# Patient Record
Sex: FEMALE | Race: WHITE | Employment: UNEMPLOYED | URBAN - NONMETROPOLITAN AREA
[De-identification: names, ages, dates, MRNs, and addresses within clinical notes are randomized per-mention and may not be internally consistent; named-entity substitution may affect disease eponyms.]

---

## 2018-07-26 ENCOUNTER — APPOINTMENT (OUTPATIENT)
Dept: GENERAL RADIOLOGY | Age: 63
End: 2018-07-26

## 2018-07-26 ENCOUNTER — HOSPITAL ENCOUNTER (EMERGENCY)
Age: 63
Discharge: HOME | End: 2018-07-26

## 2018-07-26 VITALS
RESPIRATION RATE: 18 BRPM | OXYGEN SATURATION: 98 % | HEART RATE: 90 BPM | SYSTOLIC BLOOD PRESSURE: 122 MMHG | DIASTOLIC BLOOD PRESSURE: 71 MMHG | BODY MASS INDEX: 21.26 KG/M2 | WEIGHT: 120 LBS | TEMPERATURE: 98.6 F | HEIGHT: 63 IN

## 2018-07-26 DIAGNOSIS — S70.01XA CONTUSION OF RIGHT HIP, INITIAL ENCOUNTER: Primary | ICD-10-CM

## 2018-07-26 PROCEDURE — 73502 X-RAY EXAM HIP UNI 2-3 VIEWS: CPT

## 2018-07-26 PROCEDURE — 99283 EMERGENCY DEPT VISIT LOW MDM: CPT

## 2018-07-26 RX ORDER — LEVOTHYROXINE SODIUM 0.07 MG/1
75 TABLET ORAL DAILY
COMMUNITY

## 2018-07-26 RX ORDER — METOPROLOL TARTRATE 50 MG/1
50 TABLET, FILM COATED ORAL 2 TIMES DAILY
COMMUNITY

## 2018-07-26 ASSESSMENT — ENCOUNTER SYMPTOMS
BACK PAIN: 0
SHORTNESS OF BREATH: 0
VOMITING: 0
ABDOMINAL PAIN: 0
NAUSEA: 0
EYE DISCHARGE: 0
SORE THROAT: 0
COUGH: 0
DIARRHEA: 0
RHINORRHEA: 0
WHEEZING: 0
EYE PAIN: 0

## 2018-07-26 ASSESSMENT — PAIN DESCRIPTION - ORIENTATION: ORIENTATION: RIGHT

## 2018-07-26 ASSESSMENT — PAIN DESCRIPTION - PAIN TYPE: TYPE: ACUTE PAIN;CHRONIC PAIN

## 2018-07-26 ASSESSMENT — PAIN SCALES - GENERAL: PAINLEVEL_OUTOF10: 10

## 2018-07-26 ASSESSMENT — PAIN DESCRIPTION - DESCRIPTORS: DESCRIPTORS: ACHING

## 2018-07-26 ASSESSMENT — PAIN DESCRIPTION - LOCATION: LOCATION: BACK;HIP

## 2018-07-26 NOTE — ED PROVIDER NOTES
past medical history on file. SURGICAL HISTORY      has no past surgical history on file. CURRENT MEDICATIONS       Previous Medications    LEVOTHYROXINE (SYNTHROID) 75 MCG TABLET    Take 75 mcg by mouth Daily    METOPROLOL TARTRATE (LOPRESSOR) 50 MG TABLET    Take 50 mg by mouth 2 times daily       ALLERGIES     has No Known Allergies. FAMILY HISTORY     has no family status information on file. family history is not on file. SOCIAL HISTORY      reports that she has been smoking. She has never used smokeless tobacco.    PHYSICAL EXAM     INITIAL VITALS:  height is 5' 3\" (1.6 m) and weight is 120 lb (54.4 kg). Her oral temperature is 98.6 °F (37 °C). Her blood pressure is 122/71 and her pulse is 90. Her respiration is 18 and oxygen saturation is 98%. Physical Exam   Constitutional: She is oriented to person, place, and time. She appears well-developed and well-nourished. HENT:   Head: Normocephalic and atraumatic. Right Ear: External ear normal.   Left Ear: External ear normal.   Eyes: Conjunctivae are normal. Right eye exhibits no discharge. Left eye exhibits no discharge. No scleral icterus. Neck: Normal range of motion. Neck supple. No JVD present. Pulmonary/Chest: Effort normal. No stridor. No respiratory distress. Abdominal: Soft. She exhibits no distension. Musculoskeletal: She exhibits no edema. Right hip: She exhibits decreased range of motion (secondary to pain) and tenderness (lateral aspect). She exhibits normal strength, no bony tenderness and no swelling. Neurological: She is alert and oriented to person, place, and time. She exhibits normal muscle tone. GCS eye subscore is 4. GCS verbal subscore is 5. GCS motor subscore is 6. Skin: Skin is warm and dry. She is not diaphoretic. No erythema. Psychiatric: She has a normal mood and affect. Her behavior is normal.   Nursing note and vitals reviewed.       DIFFERENTIAL DIAGNOSIS:   Fracture, sprain, strain, dislocation    DIAGNOSTIC RESULTS     EKG: All EKG's are interpreted by the Emergency Department Physician who either signs or Co-signs this chart in the absence of a cardiologist.  EKG interpreted by No att. providers found:    None    RADIOLOGY: non-plain film images(s) such as CT, Ultrasound and MRI are read by the radiologist.    XR HIP 2-3 VW W PELVIS RIGHT   Final Result   1. There is no fracture. **This report has been created using voice recognition software. It may contain minor errors which are inherent in voice recognition technology. **      Final report electronically signed by Dr. Catherine Norman on 7/26/2018 3:16 PM          LABS:   Labs Reviewed - No data to display    EMERGENCY DEPARTMENT COURSE:   Vitals:    Vitals:    07/26/18 1438   BP: 122/71   Pulse: 90   Resp: 18   Temp: 98.6 °F (37 °C)   TempSrc: Oral   SpO2: 98%   Weight: 120 lb (54.4 kg)   Height: 5' 3\" (1.6 m)       2:49 PM: The patient was seen and evaluated. Imaging was ordered and completed. MDM:  The patient was seen and evaluated within the ED today for the evaluation of right hip pain. The patient presented in no acute distress. Physical exam revealed minimal decreased range of motion secondary to pain and right lateral hip tenderness secondary to palpation. Right pelvis XR reveals no fracture. I observed the patient's condition to remain stable during the duration of their stay. She was amenable to my decision for discharge and was sent home in stable condition with Voltaren (50 mg tablet, instructed to take BID, as needed) for symptomatic relief. Upon discussing the imaging results with the patient she asked for a refill for her 5 mg Norco prescription. During review of her records I found that the patient was given 120 norco (7.5 mg) on 7-12-18 and 90 soma tablets on 7-10-18 and did not provided her with any narcotic medication and muscle relaxers. I discussed return precautions and she verbalized understanding.  I recommended that she f/u with her pcp if her symptoms worsen. All questions and concerns addressed. CRITICAL CARE:   None     CONSULTS:  None    PROCEDURES:  None     FINAL IMPRESSION      1. Contusion of right hip, initial encounter          DISPOSITION/PLAN   Discharged    PATIENT REFERRED TO:  Mark Oliverajessy Greer Treasure Castillo  [phone]    In 1 week        DISCHARGE MEDICATIONS:  New Prescriptions    DICLOFENAC (VOLTAREN) 50 MG EC TABLET    Take 1 tablet by mouth 2 times daily as needed for Pain       (Please note that portions of this note were completed with a voice recognition program.  Efforts were made to edit the dictations but occasionally words are mis-transcribed.)    Scribe:  Yin Slater 7/26/18 2:49 PM Scribing for and in the presence of Toney Mcneill PA-C    Signed by: Benitez Buenrostro, 07/26/18 3:31 PM    Provider:  I personally performed the services described in the documentation, reviewed and edited the documentation which was dictated to the scribe in my presence, and it accurately records my words and actions.     Toney Mcneill PA-C 7/26/18 3:31 PM        ALEKSANDRA Horvath  07/26/18 4259

## 2018-09-12 ENCOUNTER — OFFICE VISIT (OUTPATIENT)
Dept: PHYSICAL MEDICINE AND REHAB | Age: 63
End: 2018-09-12

## 2018-09-12 VITALS
SYSTOLIC BLOOD PRESSURE: 136 MMHG | HEART RATE: 69 BPM | BODY MASS INDEX: 21.62 KG/M2 | DIASTOLIC BLOOD PRESSURE: 84 MMHG | WEIGHT: 122 LBS | HEIGHT: 63 IN

## 2018-09-12 DIAGNOSIS — G89.4 CHRONIC PAIN SYNDROME: ICD-10-CM

## 2018-09-12 DIAGNOSIS — M47.814 SPONDYLOSIS OF THORACIC REGION WITHOUT MYELOPATHY OR RADICULOPATHY: ICD-10-CM

## 2018-09-12 DIAGNOSIS — M25.552 LEFT HIP PAIN: ICD-10-CM

## 2018-09-12 DIAGNOSIS — Z72.0 TOBACCO ABUSE: ICD-10-CM

## 2018-09-12 DIAGNOSIS — Z87.81 S/P LEFT HIP FRACTURE: ICD-10-CM

## 2018-09-12 DIAGNOSIS — M47.812 SPONDYLOSIS OF CERVICAL REGION WITHOUT MYELOPATHY OR RADICULOPATHY: Primary | ICD-10-CM

## 2018-09-12 PROCEDURE — 99244 OFF/OP CNSLTJ NEW/EST MOD 40: CPT | Performed by: PAIN MEDICINE

## 2018-09-12 RX ORDER — LORAZEPAM 0.5 MG/1
0.5 TABLET ORAL EVERY 8 HOURS PRN
COMMUNITY
End: 2022-04-14

## 2018-09-12 RX ORDER — CARISOPRODOL 350 MG/1
350 TABLET ORAL 3 TIMES DAILY PRN
COMMUNITY
End: 2022-04-14

## 2018-09-12 RX ORDER — HYDROCODONE BITARTRATE AND ACETAMINOPHEN 7.5; 325 MG/1; MG/1
1 TABLET ORAL EVERY 6 HOURS PRN
COMMUNITY
End: 2022-04-14

## 2018-09-12 ASSESSMENT — ENCOUNTER SYMPTOMS
VOMITING: 0
SINUS PRESSURE: 0
RHINORRHEA: 0
CHEST TIGHTNESS: 0
PHOTOPHOBIA: 0
VISUAL CHANGE: 1
ABDOMINAL PAIN: 0
SHORTNESS OF BREATH: 1
CONSTIPATION: 0
WHEEZING: 0
EYE PAIN: 0
BACK PAIN: 1
COUGH: 0
NAUSEA: 0
COLOR CHANGE: 0
SORE THROAT: 0
DIARRHEA: 0

## 2018-09-12 NOTE — LETTER
[address],[facility] [address]  [address] [address] [facility]  Phone: [phone]  Fax: [phone]    Brianne Vogel MD        September 12, 2018     [facility] [address] [place] [address]    Patient: Isai Baldwin  MR Number: [id number]  YOB: 1955  Date of Visit: 9/12/2018    Dear Dr. Joesph Gamez:    Thank you for the request for consultation for Isai Baldwin to me for the evaluation of neck pain. Below are the relevant portions of my assessment and plan of care. If you have questions, please do not hesitate to call me. I look forward to following Emmanuel Craft along with you.     Sincerely,        Brianne Vogel MD

## 2018-09-12 NOTE — PROGRESS NOTES
[address] [facility]  [address] [facility] [address].  Dept: [phone]  Dept Fax: [phone]: [phone]    Visit Date: 9/12/2018    Collin Lambert is a 63 y.o. female who is referred for pain management evaluation and treatment per Dr. Miguel Logan and [facility] Questions   1. In the last three months, have you felt you should cut down or stop drinking or using drugs? Yes []        No [x]     2. In the last three months, has anyone annoyed you or gotten on your nerves by telling you to cut down or stop drinking or using drugs? Yes []        No [x]     3. In the last three months, have you felt guilty or bad about how much you drink or use drugs? Yes []        No [x]     4. In the last three months, have you been waking up wanting to have an alcoholic drink or use drugs? Yes []        No [x]        Opioid Risk Tool:  Clinician Form       1. Family History of Substance Abuse: Female Male    Alcohol   []1   []3    Illegal drugs   []2   []3    Prescription drugs     []4   []4   2. Personal History of Substance Abuse:          Alcohol   []3   []3    Illegal drugs   []4   []4    Prescription drugs     []5   []5   3. Age (tani box if between 12 and 39):     []1   []1   4. History of Preadolescent Sexual Abuse:     []3   []0   5. Psychological Disease:      Attention deficit disorder, obsessive-compulsive disorder, bipolar, schizophrenia   []2   []2      Depression     []1   []1    Scoring Totals 0 0     Total Score  Low Risk  Moderate Risk  High Risk   Risk Category   0 - 3   4 - 7   8 or Above      Patient states symptoms interfere with:  A.  General Activity:  yes   B. Mood: yes    C. Walking Ability:   yes   D. Normal Work (Includes both work outside the home and housework):   yes    E.  Relations with Other People:  yes   F. Sleep:   yes   G.  Enjoyment of Life:  yes       HPI:     Chief metoprolol tartrate (LOPRESSOR) 50 MG tablet, Take 50 mg by mouth 2 times daily, Disp: , Rfl:     levothyroxine (SYNTHROID) 75 MCG tablet, Take 75 mcg by mouth Daily, Disp: , Rfl:     Subjective:      Review of Systems   Constitutional: Positive for activity change and fatigue. Negative for appetite change, chills, diaphoresis, fever and unexpected weight change. HENT: Negative for congestion, ear pain, hearing loss, mouth sores, nosebleeds, rhinorrhea, sinus pressure and sore throat. Eyes: Negative for photophobia, pain and visual disturbance. Respiratory: Positive for shortness of breath. Negative for cough, chest tightness and wheezing. Cardiovascular: Negative for chest pain, palpitations and syncope. Gastrointestinal: Negative for abdominal pain, constipation, diarrhea, nausea and vomiting. Endocrine: Negative for cold intolerance, heat intolerance, polydipsia, polyphagia and polyuria. Genitourinary: Negative for decreased urine volume, difficulty urinating, frequency and hematuria. Musculoskeletal: Positive for arthralgias, back pain, gait problem and neck pain. Negative for joint swelling, myalgias and neck stiffness. Skin: Negative for color change and rash. Allergic/Immunologic: Negative for food allergies and immunocompromised state. Neurological: Positive for tingling and headaches. Negative for dizziness, tremors, seizures, syncope, facial asymmetry, speech difficulty, weakness, light-headedness and numbness. Hematological: Does not bruise/bleed easily. Psychiatric/Behavioral: Positive for sleep disturbance. Negative for agitation, behavioral problems, confusion, decreased concentration, dysphoric mood, hallucinations, self-injury and suicidal ideas. The patient is nervous/anxious. The patient is not hyperactive.         Objective:     Vitals:    09/12/18 0758   BP: 136/84   Site: Right Upper Arm   Position: Sitting   Pulse: 69   Weight: 122 lb (55.3 kg)   Height: 5' 2.99\" Spondylosis of thoracic region without myelopathy or radiculopathy    3. Left hip pain    4. S/p left hip fracture    5. Chronic pain syndrome    6. Tobacco abuse            Plan:      · Patient read and signed orientation and opioid agreement. · OARRS reviewed. · Discussed long term side effects of medications. · Discussed tolerance, dependency and addiction. · UDS preformed today. · Patient told can not receive any pain medications from any other source. · Discussed with patient they may not be pain free. · No evidence of abuse, diversion or aberrant behavior. · Medications and/or procedures improve function and quality of life. · Reviewed XR Cervical and thoracic spine in detail with patient. · Recommend C-facet MBB @ C4-5,C5-6 and C6-7 bilateral for diagnostic and therapeutic purpose. Risks and procedure reviewed in detail Model used to discuss pathology. Questions answered. · Prescription Needs: No prescription pain medications at this time. Await UDS results. Told patient this is a comprehensive pain management program. Verbalizes understanding. · Counseled regarding Tobacco abuse. Previous Treatments tried:  · PT: No,    · NSAIDs: Yes,  any benefit? Yes,  how long taken: taking  · Chiropractic: No,  any benefit? No  · Muscle relaxants: Yes,  any benefit? Yes  · Narcotics: Yes,  any benefit? Yes  · Spine surgeon consult: No  · Any Implants: Yes    Meds. Prescribed:   No orders of the defined types were placed in this encounter. Return for Bilateral C-facet MBB @ C4-5,C5-6 and C6-7 . Time spent with patient was 60 minutes more than 50% was spent  Counseling/coordinated the patient's care.     Electronically signed by Lor Thrasher MD on 9/12/2018 at 9:54 AM

## 2018-09-21 DIAGNOSIS — G89.4 CHRONIC PAIN SYNDROME: Primary | ICD-10-CM

## 2018-09-21 RX ORDER — HYDROCODONE BITARTRATE AND ACETAMINOPHEN 5; 325 MG/1; MG/1
1 TABLET ORAL EVERY 8 HOURS PRN
Qty: 42 TABLET | Refills: 0 | Status: SHIPPED | OUTPATIENT
Start: 2018-09-21 | End: 2018-10-02 | Stop reason: SDUPTHER

## 2018-10-02 DIAGNOSIS — G89.4 CHRONIC PAIN SYNDROME: ICD-10-CM

## 2018-10-02 RX ORDER — HYDROCODONE BITARTRATE AND ACETAMINOPHEN 5; 325 MG/1; MG/1
1 TABLET ORAL EVERY 6 HOURS PRN
Qty: 120 TABLET | Refills: 0 | Status: SHIPPED | OUTPATIENT
Start: 2018-10-04 | End: 2018-11-03

## 2018-10-02 NOTE — TELEPHONE ENCOUNTER
Krishna Posada (on HIPAA) notified. OARRS reviewed. UDS: positive for lorazepam only - initial screen. Last seen: 9/12/2018.  Follow-up: 12/26/2018

## 2018-10-02 NOTE — TELEPHONE ENCOUNTER
Pt. States the norco 5-325mg TID is not helping at all. She is asking to increase it to Norco 7.5-325. Please advise.

## 2018-10-08 ENCOUNTER — TELEPHONE (OUTPATIENT)
Dept: PHYSICAL MEDICINE AND REHAB | Age: 63
End: 2018-10-08

## 2018-10-08 NOTE — LETTER
[x]  You have failed to provide requested specimen for a drug screen    [x]  You have failed to provide requested medication for a pill count    []  Your prescription medication were not present in your drug screen    []  You had additional non-disclosed medication present in your drug screen that was not prescribed by Pain Management. Call the [facility] [place] for help at [phone]. Or [facility] [address] [place] [address] at [phone] or [phone]. Or [facility], [facility] [phone]. Or [facility] at [phone]. []  Illegal substances were found in your drug screen. You should report to a drug treatment center immediately for rehabilitation. Your referring physician will receive a copy of this letter. It will be your responsibility to contact their office for continued care. This decision is final.    Any upcoming visits that are scheduled with our office will be canceled. If you have an emergency while waiting for an appointment with another facility, it is recommended that you go to your nearest emergency room. If you have any questions or concerns, please don't hesitate to call.     Sincerely,        Linda Deluca MD

## 2018-10-15 ENCOUNTER — TELEPHONE (OUTPATIENT)
Dept: PHYSICAL MEDICINE AND REHAB | Age: 63
End: 2018-10-15

## 2019-03-29 ENCOUNTER — TELEPHONE (OUTPATIENT)
Dept: FAMILY MEDICINE CLINIC | Age: 64
End: 2019-03-29

## 2022-04-14 ENCOUNTER — OFFICE VISIT (OUTPATIENT)
Dept: PSYCHIATRY | Age: 67
End: 2022-04-14

## 2022-04-14 VITALS
BODY MASS INDEX: 32.67 KG/M2 | HEIGHT: 63 IN | SYSTOLIC BLOOD PRESSURE: 122 MMHG | DIASTOLIC BLOOD PRESSURE: 72 MMHG | WEIGHT: 184.4 LBS

## 2022-04-14 DIAGNOSIS — F41.1 GAD (GENERALIZED ANXIETY DISORDER): Primary | ICD-10-CM

## 2022-04-14 DIAGNOSIS — F32.1 MAJOR DEPRESSIVE DISORDER, SINGLE EPISODE, MODERATE (HCC): ICD-10-CM

## 2022-04-14 DIAGNOSIS — F43.12 CHRONIC POST-TRAUMATIC STRESS DISORDER (PTSD): ICD-10-CM

## 2022-04-14 DIAGNOSIS — F51.05 INSOMNIA DUE TO MENTAL DISORDER: ICD-10-CM

## 2022-04-14 PROBLEM — G47.00 INSOMNIA: Status: ACTIVE | Noted: 2022-04-14

## 2022-04-14 PROCEDURE — 90833 PSYTX W PT W E/M 30 MIN: CPT

## 2022-04-14 PROCEDURE — 99204 OFFICE O/P NEW MOD 45 MIN: CPT

## 2022-04-14 RX ORDER — ESCITALOPRAM OXALATE 10 MG/1
10 TABLET ORAL DAILY
Qty: 30 TABLET | Refills: 0 | Status: SHIPPED | OUTPATIENT
Start: 2022-04-14 | End: 2022-05-06

## 2022-04-14 RX ORDER — TRAZODONE HYDROCHLORIDE 50 MG/1
50 TABLET ORAL NIGHTLY
Qty: 60 TABLET | Refills: 0 | Status: SHIPPED | OUTPATIENT
Start: 2022-04-14 | End: 2022-05-06

## 2022-04-14 RX ORDER — HYDROXYZINE 50 MG/1
50 TABLET, FILM COATED ORAL 3 TIMES DAILY PRN
Qty: 30 TABLET | Refills: 1 | Status: SHIPPED | OUTPATIENT
Start: 2022-04-14 | End: 2022-05-06

## 2022-04-14 ASSESSMENT — PATIENT HEALTH QUESTIONNAIRE - PHQ9
10. IF YOU CHECKED OFF ANY PROBLEMS, HOW DIFFICULT HAVE THESE PROBLEMS MADE IT FOR YOU TO DO YOUR WORK, TAKE CARE OF THINGS AT HOME, OR GET ALONG WITH OTHER PEOPLE: 1
5. POOR APPETITE OR OVEREATING: 1
3. TROUBLE FALLING OR STAYING ASLEEP: 3
6. FEELING BAD ABOUT YOURSELF - OR THAT YOU ARE A FAILURE OR HAVE LET YOURSELF OR YOUR FAMILY DOWN: 2
8. MOVING OR SPEAKING SO SLOWLY THAT OTHER PEOPLE COULD HAVE NOTICED. OR THE OPPOSITE, BEING SO FIGETY OR RESTLESS THAT YOU HAVE BEEN MOVING AROUND A LOT MORE THAN USUAL: 3
1. LITTLE INTEREST OR PLEASURE IN DOING THINGS: 3
SUM OF ALL RESPONSES TO PHQ9 QUESTIONS 1 & 2: 6
SUM OF ALL RESPONSES TO PHQ QUESTIONS 1-9: 20
2. FEELING DOWN, DEPRESSED OR HOPELESS: 3
7. TROUBLE CONCENTRATING ON THINGS, SUCH AS READING THE NEWSPAPER OR WATCHING TELEVISION: 2
SUM OF ALL RESPONSES TO PHQ QUESTIONS 1-9: 20
4. FEELING TIRED OR HAVING LITTLE ENERGY: 3
9. THOUGHTS THAT YOU WOULD BE BETTER OFF DEAD, OR OF HURTING YOURSELF: 0

## 2022-04-14 ASSESSMENT — COLUMBIA-SUICIDE SEVERITY RATING SCALE - C-SSRS
6. HAVE YOU EVER DONE ANYTHING, STARTED TO DO ANYTHING, OR PREPARED TO DO ANYTHING TO END YOUR LIFE?: NO
1. WITHIN THE PAST MONTH, HAVE YOU WISHED YOU WERE DEAD OR WISHED YOU COULD GO TO SLEEP AND NOT WAKE UP?: NO
2. HAVE YOU ACTUALLY HAD ANY THOUGHTS OF KILLING YOURSELF?: NO

## 2022-04-14 ASSESSMENT — ENCOUNTER SYMPTOMS: COUGH: 1

## 2022-04-14 NOTE — PROGRESS NOTES
insomnia, unable to fall asleep, wakes frequently, not feeling rested, and sleeps 3 hours of broken sleep per night. Patient denies having done a sleep study before and discussed reasons for referral.      DEPRESSION ASSESSMENT:  Over the past 2 weeks most of the day, nearly every day:   Depressed mood? Yes  Diminished interest? Yes  Weight loss or weight gain? No  Insomnia or Hypersomnia? yes insomnia  Are you able to fall asleep? No   Stay asleep? No   How many hours are you sleeping during the day and night? TIME HOURS: 3 hours of broken sleep  Psychomotor agitation or retardation? Yes restlessness  Fatigue? Yes  Hopelessness? Yes    Helplessness? Yes   Worthlessness? Yes   Excessive or inappropriate guilt? Yes, not feeling like she was a good enough mother to her children  Diminished concentration? Yes  Suicidal ideation, plan, or attempt? No  Symptoms causing significant distress or impairment in social, occupational, or other areas of functioning? Yes  When did this episode begin? For past 1 year became worse    ANXIETY ASSESSMENT:  Anxiety and worry more days than not for at least 6 months? Yes  Difficulty controlling worry? Yes  Is the anxiety and worry associated with any of the following symptoms:  Restlessness or feeling keyed up or on edge? Yes  Easily fatigued? Yes  Difficulty concentrating or you mind goes blank? Yes  Irritability? Yes  Muscle tension? Yes  Sleep disturbance:   Difficulty falling asleep? Yes   Difficulty staying asleep? Yes   Do you feel rested when you wake? No  Does the anxiety and worry or physical symptoms cause significant distress or impairment in social, occupational, or other areas of functioning? Yes  Are the anxiety and worry attributable to substance use? No    PTSD ASSESSMENT:    Exposed to actual or threatened death, serious injury, or sexual violence in one of the following ways:  Directly experienced or witnessed?  Yes   Learning a traumatic event occurred to a close family/friend? Yes  Repeated traumatic event? Yes  Have you had any of the following? Recurrent or intrusive thoughts or dreams of the trauma? Yes Patient endorses nightmares 3 times per month  Flashbacks? Yes   Have you lost awareness of present surroundings during a flashback? Yes  Psychological distress from external cues that symbolize the trauma? Yes  Inability to remember the traumatic event? No  Persistent negative beliefs or expectations about self, others, or the world? Yes  Persistent, distorted cognitions about the cause of the trauma, blaming self? Yes Patient stated she blames herself regarding her husbands suicide, stating \"what if's\"  Persistent negative emotional state? Yes  Diminished interest? Yes  Detachment or estrangement from others? Yes  Inability to experience positive emotions such as happiness, satisfaction, love? Yes  Irritability or anger outbursts? Yes  Reckless or self-destructive behavior? No  Hypervigilance? Yes  Exaggerated startle response? Yes  Problems with concentration? Yes  Sleep disturbance? Yes  Symptoms lasted 3 days to 1 month following the trauma or longer than one month? Yes for many year(s)  Symptoms caused significant distress or impairment in social, occupational, or other functioning? Yes    No Labs on file. Motivational Interviewing and Cognitive Behavioral Therapy utilized, including behavioral modification, insight oriented, and supportive therapy. Patient is encouraged to utilize nonpharmacologic coping skills such as deep breathing, guided imagery, guided meditation, muscle relaxation, calming music, and/or journaling. Records review of communications , labs, and medications from an internal/external source completed. PSYCHIATRIC HISTORY:  Patient has had prior care with the following:    [] Psychiatrist  Denies    [] Psychologist Denies    [] Other Therapist Denies    [] None    The patient has had 0 lifetime suicide attempts.    Patient reports 0 psych hospital admissions    Patient endorses currently taking the following psychiatric medications: Melatonin 5mg  Past psychiatric medications include: Amitryptaline, Xanax, Ambien, Zoloft, Hydroxyzine, Seroquel-(which she stated worked for her), other unknown medications  Adverse reactions from psychotropic medications:  Denies    ALLERGIES:    Patient has no known allergies. Lifetime Psychiatric Review of Systems:       Obsessions and Compulsions: denies     Birgit or Hypomania: denies     Panic or Anxiety Attacks:  affirms anxiety attacks twice per day for years, with symptoms of rapid heart rate, shaky, shortness of breath, choking feeling     Hallucinations:  denies     Delusions: denies     Phobias:  denies     Body or Vocal Tics: denies    SOCIAL HISTORY:  Patient was born in [address] [place] [address] and raised in [place] by her biological parents and 3 siblings  Residence: [facility] in a trailer with a male friend  Level of Education:  Completed 7th grade, did not finish school  Marital Status:  x2  Children:  4 children, ages 30,85,80,28  Occupation: Unemployed. Patient states she gets spousal support for husbands deaths  Patient Assets/Support system:   Support people include her daughter, and male friend she lives with. Endorsed coping skills: Distraction, watch tv, read books, imagery of children  The patient endorses feeling safe at home Yes    Drug Use History:  Tobacco Use Smokes 1ppd for past 10 years, noted smokers cough. Alcohol Use  Denies   Drug Use  Denies    Legal History: Denies  History of Incarceration: no    Social History     Socioeconomic History    Marital status:       Spouse name: Not on file    Number of children: Not on file    Years of education: Not on file    Highest education level: Not on file   Occupational History    Not on file   Tobacco Use    Smoking status: Heavy Tobacco Smoker    Smokeless tobacco: Never Used   Substance and Sexual Activity    Alcohol use: No    Drug use: No    Sexual activity: Not on file   Other Topics Concern    Not on file   Social History Narrative    Not on file     Social Determinants of Health     Financial Resource Strain:     Difficulty of Paying Living Expenses: Not on file   Food Insecurity:     Worried About Running Out of Food in the Last Year: Not on file    [place]ev[place] of Food in the Last Year: Not on file   Transportation Needs:     Lack of Transportation (Medical): Not on file    Lack of Transportation (Non-Medical): Not on file   Physical Activity:     Days of Exercise per Week: Not on file    Minutes of Exercise per Session: Not on file   Stress:     Feeling of Stress : Not on file   Social Connections:     Frequency of Communication with Friends and Family: Not on file    Frequency of Social Gatherings with Friends and Family: Not on file    Attends Congregational Services: Not on file    Active Member of [address] [place] [address] [employer] or Organizations: Not on file    Attends Club or Organization Meetings: Not on file    Marital Status: Not on file   Intimate Partner Violence:     Fear of Current or Ex-Partner: Not on file    Emotionally Abused: Not on file    Physically Abused: Not on file    Sexually Abused: Not on file   Housing Stability:     Unable to Pay for Housing in the Last Year: Not on file    Number of [place] in the Last Year: Not on file    Unstable Housing in the Last Year: Not on file       FAMILY HISTORY:   History reviewed. No pertinent family history.     PSYCHIATRIC FAMILY HISTORY:  Denies  Suicides in family: first  car accident in 1976  Substance use in family: father alcoholic, first  alcoholic    PAST MEDICAL HISTORY:    Past Medical History:   Diagnosis Date    Anxiety     COPD (chronic obstructive pulmonary disease) ([facility] [address]     Hypertension     Osteoporosis     Thyroid disease        PAST SURGICAL HISTORY:    Past Surgical History:   Procedure Laterality Date    HIP SURGERY Left PREVIOUSMEDICATIONS:  Outpatient Medications Prior to Visit   Medication Sig Dispense Refill    HYDROcodone-acetaminophen (NORCO) 7.5-325 MG per tablet Take 1 tablet by mouth every 6 hours as needed for Pain. Cassie Mcpherson LORazepam (ATIVAN) 0.5 MG tablet Take 0.5 mg by mouth every 8 hours as needed for Anxiety. Cassie Mcpherson carisoprodol (SOMA) 350 MG tablet Take 350 mg by mouth 3 times daily as needed for Muscle spasms. Cassie Mcpherson metoprolol tartrate (LOPRESSOR) 50 MG tablet Take 50 mg by mouth 2 times daily      levothyroxine (SYNTHROID) 75 MCG tablet Take 75 mcg by mouth Daily       No facility-administered medications prior to visit. REVIEW OF SYSTEMS:    Review of Systems   Respiratory: Positive for cough. Musculoskeletal: Positive for arthralgias. Psychiatric/Behavioral: Positive for agitation, decreased concentration, dysphoric mood and sleep disturbance. The patient is nervous/anxious. All other systems reviewed and are negative. The patient sees Christie Cobian as her primary care provider. SPECIALISTS: Denies    OBJECTIVE DATA     /72   Ht 5' 3\" (1.6 m)   Wt 184 lb 6.4 oz (83.6 kg)   BMI 32.66 kg/m²     Pain Level: affirms \"pain in joints all the time\" 10/10 osteoarthritis.      Wt Readings from Last 3 Encounters:   04/14/22 184 lb 6.4 oz (83.6 kg)   09/12/18 122 lb (55.3 kg)   07/26/18 120 lb (54.4 kg)        Physical Exam     Mental Status Exam:   Level of consciousness:  within normal limits  Appearance:  in chair and fair grooming  Appropriate for age, smells of cigarettes smoke  Behavior/Motor:  psychomotor retardation  Attitude toward examiner:  cooperative, attentive and good eye contact  Speech:  spontaneous, normal volume and slow  Mood:  \"jittery\"   Anxious and Sad  Affect:  mood congruent and flat  Thought processes:  linear, goal directed, coherent, slow and circumstantial  Thought content:  Denies homicidal ideation  Suicidal Ideation:  denies suicidal ideation  Delusions: no evidence of delusions  Perceptual Disturbance:  denies any perceptual disturbance  Cognition: Patient is oriented to person, place, time and situation  Concentration: poor  Memory: impaired  Insight & Judgement: limited   Medication Side Effects: Absent  Attention Span: Attention span and concentration were age appropriate    Screenings Completed in This Encounter:   Anxiety and Depression:      GA-DSM-IV 1  Excessive anxiety or worry about a number of events or activities?: Nearly every day  Finding it difficult to control worrying?: Nearly every day  Feeling restless, keyed up or on edge?: Nearly every day  Being easily fatigued?: Nearly every day  Difficulty concentrating or your mind going blank?: Nearly every day  Being Irritable?: Nearly every day  Having muscle tension?: Nearly every day  Having disturbed sleep, such as difficulty falling asleep, difficulty staying asleep or restless unsatisfying sleep?: Nearly every day  Feeling distressed because of these problems?: Nearly every day  How difficult have these problems made it for you to do your work, take care of things at home, or get along with other people?: Extremely difficult  Total Score: 33    PHQ-2 Over the past 2 weeks, how often have you been bothered by any of the following problems? Little interest or pleasure in doing things: Nearly every day  Feeling down, depressed, or hopeless: Nearly every day  PHQ-2 Score: 6  PHQ-9 Over the past 2 weeks, how often have you been bothered by any of the following problems?   Trouble falling or staying asleep, or sleeping too much: Nearly every day  Feeling tired or having little energy: Nearly every day  Poor appetite or overeating: Several days  Feeling bad about yourself - or that you are a failure or have let yourself or your family down: More than half the days  Trouble concentrating on things, such as reading the newspaper or watching television: More than half the days  Moving or speaking so slowly that other people could have noticed. Or the opposite - being so fidgety or restless that you have been moving around a lot more than usual: Nearly every day  Thoughts that you would be better off dead, or of hurting yourself in some way: Not at all  If you checked off any problems, how difficult have these problems made it for you to do your work, take care of things at home, or get along with other people?: Somewhat difficult  PHQ-9 Total Score: 20  PHQ-9 Total Score: 20     DIAGNOSIS AND ASSESSMENT DATA     DSM-5 DIAGNOSIS:   1. BETY (generalized anxiety disorder)    2. Chronic post-traumatic stress disorder (PTSD)    3. Major depressive disorder, single episode, moderate ([facility]    4. Insomnia due to mental disorder      Rule Out Panic Disorder  Rule Out Bereavement Disorder  Rule Out Obstructive Sleep Apnea    Psychosocial and Contextual Factors[de-identified]  Financial  Occupational  Relationships  Legal  Living situation  Educational      PLAN   Follow-up:  Return in about 2 weeks (around 4/28/2022) for anxiety, insomnia, medication managment, depression. Start Lexapro 10mg daily in the morning for anxiety and depression  Start Trazodone 50mg nightly for sleep  Start Hydroxyzine 50mg three times per day as needed for anxiety  Consider psychotherapy, gave list of providers   Exercise 30 minutes 3-4 times per week  Increase fluids, drink at least 8 glasses of water daily, no caffeine after 3pm  Sleep hygiene  Healthy diet  Obtain labs prior to next appointment: CBC, CMP, Lipid panel, Hepatic Function Panel, TSH, Vitamin D, Vitamin B12, ferritin, folate, and magnesium level. Patient goal: deep breathing with imagery when anxiety starts.   Referral to pulmonology for sleep study    Prescriptions for this encounter:  New Prescriptions    ESCITALOPRAM (LEXAPRO) 10 MG TABLET    Take 1 tablet by mouth daily    HYDROXYZINE (ATARAX) 50 MG TABLET    Take 1 tablet by mouth 3 times daily as needed for Anxiety    TRAZODONE (DESYREL) 50 MG TABLET    Take 1 tablet by mouth nightly Take 1 tabs by mouth at bedtime as needed for insomnia       Orders Placed This Encounter   Medications    traZODone (DESYREL) 50 MG tablet     Sig: Take 1 tablet by mouth nightly Take 1 tabs by mouth at bedtime as needed for insomnia     Dispense:  60 tablet     Refill:  0    escitalopram (LEXAPRO) 10 MG tablet     Sig: Take 1 tablet by mouth daily     Dispense:  30 tablet     Refill:  0    hydrOXYzine (ATARAX) 50 MG tablet     Sig: Take 1 tablet by mouth 3 times daily as needed for Anxiety     Dispense:  30 tablet     Refill:  1       Medications Discontinued During This Encounter   Medication Reason    carisoprodol (SOMA) 350 MG tablet LIST CLEANUP    HYDROcodone-acetaminophen (NORCO) 7.5-325 MG per tablet LIST CLEANUP    LORazepam (ATIVAN) 0.5 MG tablet LIST CLEANUP       Additional orders:  Orders Placed This Encounter   Procedures    CBC with Auto Differential    Comprehensive Metabolic Panel    Lipid Panel    Magnesium    Hepatic Function Panel    Vitamin B12 & Folate    Vitamin D 25 Hydroxy    TSH With Reflex Ft4    Ferritin    External Referral To Pulmonology     Antidepressant Medications: We discussed the risks/benefits and side effects of medications. I stressed in particular side effects including but not limited to gastrointestinal problems, sexual dysfunction, serotonin syndrome, agitation, rare induction of jax, rare activation of suicidality. Trazodone: Off-label medicaiton used to treat insomnia, has sedating effects likely mediated by antagonism of 5-HT2 and alpha-adrenergic receptors. However, it also works as an antagonist of 5-HT1A and 5HT1C receptors and as a weak serotonin-reuptake inhibitor, which altogether may alter sleep architecture and quality. It can also exacerbate symptoms of restless legs, can cause or exacerbate REM Sleep Behavior Disorder, and can increase symptoms of daytime sleep.      We discussed the effects alcohol and illicit substances have on mood, thought process, physical health and interactions with medications. Discussed the side effects of nicotine and caffeine in sleep disturbance and anxiety. The client and I reviewed several treatment options to address his/her symptoms. I explained the risks/benefits of treatment with and without medication. The patient participated in and indicated understanding of the content of our discussion by agreeing to the mutually developed treatment plan. Risks, potential side effects, possible drug-drug interactions, benefits and alternate treatments discussed in detail. All questions answered. Patient stated understanding and is agreeable to treatment plan. Patient has been instructed to seek emergency help via the emergency and/or calling 911 should symptoms become severe, worsen, or with other concerning symptoms. Patient instructed to go immediately to the emergency room and/or call 911 with any suicidal or homicidal ideations or if audio/visual hallucinations develop. Patient stated understanding and agrees. Patient given crisis center information. I spent a total of 70 minutes with the patient and over half of that time was spent on counseling and coordination of care regarding topics discussed above. I spent >16 minutes with the patient for psychotherapy. The patient was engaged and responsive throughout session. Utilized CBT, MI and reflective listening to address topics above. The remainder of session spent on symptom evaluation and medication management. Provider Signature:  Electronically signed by EDD Rodrigez CNP on 4/14/2022 at 4:21 PM    **This report has been created using voice recognition software. It may contain minor errors which are inherent in voice recognition technology. **

## 2022-04-14 NOTE — PATIENT INSTRUCTIONS
Patient Education        Learning About Generalized Anxiety Disorder  What is generalized anxiety disorder? We all worry. It's a normal part of life. But when you have generalized anxiety disorder, you worry about lots of things and have a hard time stopping yourworry. This worry or anxiety interferes with your relationships, work, and life. What causes it? The cause of generalized anxiety disorder is not known. Some studies show that it might be passed down through families. Several things can cause symptoms of anxiety. They include some health problems, some medicines, too much caffeine,and illegal drugs such as cocaine. What are the symptoms? Generalized anxiety disorder can make you feel worried and stressed about manythings almost every day. You may have a hard time controlling your worry. Symptoms include:   Feeling tired or cranky. You may have a hard time concentrating.  Having headaches or muscle aches.  Feeling shaky, sweating, or having hot flashes.  Feeling lightheaded, sick to your stomach, or out of breath.  Feeling like you can't relax. Or being startled easily.  Having problems falling or staying asleep. How is it diagnosed? Your doctor will ask about your health and how often you worry or feel anxious. People with generalized anxiety disorder have more worry and stress than normal. They feel worried and stressed about many things almost every day. Andthese feelings have lasted for at least 6 months. Your doctor also may ask about other symptoms, like whether you:   Feel restless.  Feel tired.  Have a hard time thinking or feel that your mind goes blank.  Feel cranky.  Have tense muscles.  Have sleep problems. A physical exam and tests can help make sure that your symptoms aren't causedby a different condition, such as a thyroid problem. How is it treated? Counseling and medicine can both work to treat anxiety.  The two are often usedalong with lifestyle changes. Cognitive-behavioral therapy (CBT) is a type of counseling that's used to help treat anxiety. In CBT, you learn how to notice and replace thoughts that [facility] feel worried. It also can help you learn how to relax when you worry. Applied relaxation therapy may also be used. Your counselor might ask you toimagine a calming situation. This can help you relax. Medicines can help. These medicines are often also used for depression. Selective serotonin reuptake inhibitors (SSRIs) are often tried first. But there are other medicines that your doctor may use. You may need to try a fewmedicines to find one that works well. Many people feel better by getting regular exercise, eating healthy meals, and getting good sleep. Mindfulnessfocusing on things in the present momentalsocan help reduce your anxiety. What can you expect when you have it? Having anxiety can be upsetting. Some people might feel less worried and stressed after a couple of months of treatment. But for other people, it mighttake longer to feel better. Reaching out to people for help is important. Try not to isolate yourself. Let your family and friends help you. Find someone you can trust and confide in. Talk to that person. When you know what anxiety isand how you can get help for ityou can start to learn new ways of thinking. This can help you cope and work through [employer]. Follow-up care is a key part of your treatment and safety. Be sure to make and go to all appointments, and call your doctor if you are having problems. It's also a good idea to know your test results and keep alist of the medicines you take. Where can you learn more? Go to [email or web address]://[email or web address].[email or web address] org and sign in to your [employer] account. Enter G110 in the [employer] box to learn more about \"Learning About Generalized Anxiety Disorder. \"     If you do not have an account, please click on the \"Sign Up Now\" link.   Current as of: June 16, 2021               Content Version: 13.2  © 2006-2022 Healthwise, leemail. Care instructions adapted under license by [facility] ([facility]). If you have questions about a medical condition or this instruction, always ask your healthcare professional. [address] any warranty or liability for your use of this information. Patient Education        Recovering From Depression: Care Instructions  Your Care Instructions     Taking good care of yourself is important as you recover from depression. In time, your symptoms will fade as your treatment takes hold. Do not give up. Instead, focus your energy on getting better. Your mood will improve. It just takes some time. Focus on things that can help you feel better, such as being with friends and family, eating well, and getting enough rest. But take things slowly. Do not do too much too soon. Youwill begin to feel better gradually. Follow-up care is a key part of your treatment and safety. Be sure to make and go to all appointments, and call your doctor if you are having problems. It's also a good idea to know your test results and keep alist of the medicines you take. How can you care for yourself at home? Be realistic   If you have a large task to do, break it up into smaller steps you can handle, and just do what you can.  You may want to put off important decisions until your depression has lifted. If you have plans that will have a major impact on your life, such as marriage, divorce, or a job change, try to wait a bit. Talk it over with friends and loved ones who can help you look at the overall picture first.   Reaching out to people for help is important. Do not isolate yourself. Let your family and friends help you. Find someone you can trust and confide in, and talk to that person.  Be patient, and be kind to yourself. Remember that depression is not your fault and is not something you can overcome with willpower alone. Treatment is important for depression, just like for any other illness. Feeling better takes time, and your mood will improve little by little. Stay active   Stay busy and get outside. Take a walk, or try some other light exercise.  Talk with your doctor about an exercise program. Exercise can help with mild depression.  Go to a movie or concert. Take part in a Cheondoism activity or other social gathering. Go to a ball game.  Ask a friend to have dinner with you. Take care of yourself   Eat a balanced diet with plenty of fresh fruits and vegetables, whole grains, and lean protein. If you have lost your appetite, eat small snacks rather than large meals.  Avoid using illegal drugs or marijuana and drinking alcohol. Do not take medicines that have not been prescribed for you. They may interfere with medicines you may be taking for depression, or they may make your depression worse.  Take your medicines exactly as they are prescribed. You may start to feel better within 1 to 3 weeks of taking antidepressant medicine. But it can take as many as 6 to 8 weeks to see more improvement. If you have questions or concerns about your medicines, or if you do not notice any improvement by 3 weeks, talk to your doctor.  Continue to take your medicine after your symptoms improve. Taking your medicine for at least 6 months after you feel better can help keep you from getting depressed again. If this isn't the first time you have been depressed, your doctor may recommend you to take medicine even longer.  If you have any side effects from your medicine, tell your doctor. Many side effects are mild and will go away on their own after you have been taking the medicine for a few weeks. Some may last longer. Talk to your doctor if side effects are bothering you too much. You might be able to try a different medicine.  Continue counseling.  It may help prevent depression from returning, especially if you've had multiple \"Recovering From Depression: Care Instructions. \"     If you do not have an account, please click on the \"Sign Up Now\" link. Current as of: June 16, 2021               Content Version: 13.2  © 2006-2022 [employer]. Care instructions adapted under license by [facility] ([facility]). If you have questions about a medical condition or this instruction, always ask your healthcare professional. [place] [address] any warranty or liability for your use of this information. Patient Education        Insomnia: Care Instructions  Overview     Insomnia is the inability to sleep well. Insomnia may make it hard for you to get to sleep, stay asleep, or sleep as long as you need to. This can make you tired and grouchy during the day. It can also make you forgetful, lesseffective at work, and unhappy. Insomnia can be linked to many things. These include health problems,medicines, and stressful events. Treatment may include treating problems that may be linked with your insomnia. Treatment also includes behavior and lifestyle changes. This may include cognitive-behavioral therapy for insomnia (CBT-I). CBT-I uses different ways to help you change your thoughts and behaviors that may interfere with sleep. Your doctor can recommend specific things you can try. Examples include doing relaxation exercises, keeping regular bedtimes and wake times, limiting alcohol, and making healthy sleep habits. Some people decide to take medicinefor a while to help with sleep. Follow-up care is a key part of your treatment and safety. Be sure to make and go to all appointments, and call your doctor if you are having problems. It's also a good idea to know your test results and keep alist of the medicines you take. How can you care for yourself at home? Cognitive-behavioral therapy for insomnia (CBT-I)   If your doctor recommends CBT-I, follow your treatment plan.  Your doctor will give you instructions that are unique for you.  Your plan will likely include a few things that you can try at home. For example:  ? Try meditation or other relaxation techniques before you go to bed. ? Go to bed at the same time every night, and wake up at the same time every morning. Do not take naps during the day. ? Do not stay in bed awake for too long. If you can't fall asleep, or if you wake up in the middle of the night and can't get back to sleep within about 15 to 20 minutes, get out of bed and go to another room until you feel sleepy. ? If watching the clock makes you anxious, turn it facing away from you so you cannot see the time. ? If you worry when you lie down, start a worry book. Well before bedtime, write down your worries, and then set the book and your concerns aside. Healthy sleep habits   If your doctor recommends it, try making healthy sleep habits. For example:  ? Keep your bedroom quiet, dark, and cool. ? Do not have drinks with caffeine, such as coffee or black tea, for 8 hours before bed. ? Do not smoke or use other types of tobacco near bedtime. Nicotine is a stimulant and can keep you awake. ? Avoid drinking alcohol late in the evening, because it can cause you to wake in the middle of the night. ? Do not eat a big meal close to bedtime. If you are hungry, eat a light snack. ? Do not drink a lot of water close to bedtime, because the need to urinate may wake you up during the night. ? Do not read, watch TV, or use your phone in bed. Use the bed only for sleeping and sex. Medicine   Be safe with medicines. Take your medicines exactly as prescribed. Call your doctor if you think you are having a problem with your medicine.  Talk with your doctor before you try an over-the-counter medicine, herbal product, or supplement to try to improve your sleep. Your doctor can recommend how much to take and when to take it.  Make sure your doctor knows all of the medicines, vitamins, herbal products, and supplements you take.  [place] You will get more details on the specific medicines your doctor prescribes. When should you call for help? Watch closely for changes in your health, and be sure to contact your doctor if:     Your efforts to improve your sleep do not work.      Your insomnia gets worse.      You have been feeling down, depressed, or hopeless or have lost interest in things that you usually enjoy. Where can you learn more? Go to https://[email or web address].[email or web address] org and sign in to your [employer] account. Enter P513 in the [employer] box to learn more about \"Insomnia: Care Instructions. \"     If you do not have an account, please click on the \"Sign Up Now\" link. Current as of: June 16, 2021               Content Version: 13.2  © 3256-2215 Healthwise, [employer]. Care instructions adapted under license by [facility] ([facility]). If you have questions about a medical condition or this instruction, always ask your healthcare professional. [place] [address] any warranty or liability for your use of this information. Patient Education        Post-Traumatic Stress Disorder (PTSD): Care Instructions  Overview     Post-traumatic stress disorder (PTSD) is a mental health problem that can result from being in or seeing a traumatic or terrifying event. These events can include combat, a terrorist attack, a natural disaster, a serious accident, an assault, or a rape. If you have PTSD, you may often relive the experience in nightmares or flashbacks. These are clear and frightening memories of theevent. You may also have trouble sleeping. PTSD affects people in very different ways. It can interfere with daily activities such as work or school, and it can make you withdraw from friends orloved ones. Follow-up care is a key part of your treatment and safety. Be sure to make and go to all appointments, and call your doctor if you are having problems.  It's also a good idea to know your test results and keep alist of the medicines you take. How can you care for yourself at home?  Take your medicines exactly as they are prescribed. If you are taking an antidepressant, you may start to feel better within 1 to 3 weeks. But it can take as many as 6 to 8 weeks to see more improvement. If you have questions or concerns about your medicines, or if you don't notice any improvement after 3 weeks, talk to your doctor.  Go to your counseling sessions and follow-up appointments.  Recognize and accept your anxiety. Then, when you are in a situation that makes you anxious, say to yourself, \"This is not an emergency. I feel uncomfortable, but I am not in danger. I can keep going even if I feel anxious. \"   Be kind to your body:  ? Get enough rest.  ? Get at least 30 minutes of exercise on most days of the week. Walking is a good choice. You also may want to do other activities, such as running, swimming, cycling, or playing tennis or team sports. ? Avoid alcohol, caffeine, nicotine, marijuana, and illegal drugs. They can make your symptoms worse. ? Learn and do relaxation techniques. See below for more about these techniques.  Keep a record of your symptoms. Discuss your fears with a good friend or family member, or join a support group for people with similar problems. Talking to others sometimes relieves stress.  Connect with others. Get involved in social groups, or volunteer to help others. Or get out and do something you enjoy. Watch a movie, or take a walk or hike with a friend.  Keep the numbers for these national suicide hotlines: 4-768-510-TALK (1-988.943.8006) and 9-071-PNLXPHO (8-630.133.4400). If you or someone you know talks about suicide or feeling hopeless, get help right away. Relaxation techniques  Do relaxation exercises 10 to 20 minutes a day. You can play soothing, relaxingmusic while you do them, if you wish.  Tell others in your house that you are going to do your relaxation exercises. Ask them not to disturb you.  Find a comfortable place, away from all distractions and noise.  Lie down on your back, or sit with your back straight.  Focus on your breathing. Make it slow and steady.  Breathe in through your nose. Breathe out through either your nose or mouth.  Breathe deeply, filling up the area between your navel and your rib cage. Breathe so that your belly goes up and down.  Do not hold your breath.  Breathe like this for 5 to 10 minutes. Notice the feeling of calmness throughout your whole body. As you continue to breathe slowly and deeply, relax by doing the following foranother 5 to 10 minutes:   Tighten and relax each muscle group in your body. You can begin at your toes and work your way up to your head.  Imagine your muscle groups relaxing and becoming heavy.  Empty your mind of all thoughts.  Let yourself relax more and more deeply.  Become aware of the state of calmness that surrounds you.  When your relaxation time is over, you can bring yourself back to alertness by moving your fingers and toes and then your hands and feet and then stretching and moving your entire body. Sometimes people fall asleep during relaxation, but they usually wake up shortly afterward.  Always give yourself time to return to full alertness before you drive a car or do anything that might cause an accident if you are not fully alert. Never play a relaxation tape while you drive a car. When should you call for help? Call 911 anytime you think you may need emergency care. For example, call if:     You feel you cannot stop from hurting yourself or someone else. Call the [facility] at [phone] if you orsomeone you know is:     Feeling hopeless or thinking of hurting or killing themselves.    Watch closely for changes in your health, and be sure to contact your doctor if:     Your PTSD symptoms are getting worse.      You have new or worse symptoms of anxiety or depression.      You are not getting better as expected. Where can you learn more? Go to [email or web address]://[email or web address].[email or web address] and sign in to your [employer] account. Enter J919 in the [facility] box to learn more about \"Post-Traumatic Stress Disorder (PTSD): Care Instructions. \"     If you do not have an account, please click on the \"Sign Up Now\" link. Current as of: June 16, 2021               Content Version: 13.2  © 2006-2022 [employer]. Care instructions adapted under license by [facility] ([facility]). If you have questions about a medical condition or this instruction, always ask your healthcare professional. [place] [address] any warranty or liability for your use of this information. Patient Education        Anxiety Disorder: Care Instructions  Your Care Instructions     Anxiety is a normal reaction to stress. Difficult situations can cause you to have symptoms such as sweaty palms and a nervous feeling. In an anxiety disorder, the symptoms are far more severe. Constant worry, muscle tension, trouble sleeping, nausea and diarrhea, and other symptoms can make normal daily activities difficult or impossible. These symptoms may occur for no reason, and they can affect your work, school, or social life. Medicines, counseling, and self-care can all help. Follow-up care is a key part of your treatment and safety. Be sure to make and go to all appointments, and call your doctor if you are having problems. It's also a good idea to know your test results and keep a list of the medicines you take. How can you care for yourself at home? · Take medicines exactly as directed. Call your doctor if you think you are having a problem with your medicine. · Go to your counseling sessions and follow-up appointments. · Recognize and accept your anxiety. Then, when you are in a situation that makes you anxious, say to yourself, \"This is not an emergency.  I feel uncomfortable, but I am not in danger. I can keep going even if I feel anxious. \"  · Be kind to your body:  ? Relieve tension with exercise or a massage. ? Get enough rest.  ? Avoid alcohol, caffeine, nicotine, and illegal drugs. They can increase your anxiety level and cause sleep problems. ? Learn and do relaxation techniques. See below for more about these techniques. · Engage your mind. Get out and do something you enjoy. Go to a funny movie, or take a walk or hike. Plan your day. Having too much or too little to do can make you anxious. · Keep a record of your symptoms. Discuss your fears with a good friend or family member, or join a support group for people with similar problems. Talking to others sometimes relieves stress. · Get involved in social groups, or volunteer to help others. Being alone sometimes makes things seem worse than they are. · Get at least 30 minutes of exercise on most days of the week to relieve stress. Walking is a good choice. You also may want to do other activities, such as running, swimming, cycling, or playing tennis or team sports. Relaxation techniques  Do relaxation exercises 10 to 20 minutes a day. You can play soothing, relaxing music while you do them, if you wish. · Tell others in your house that you are going to do your relaxation exercises. Ask them not to disturb you. · Find a comfortable place, away from all distractions and noise. · Lie down on your back, or sit with your back straight. · Focus on your breathing. Make it slow and steady. · Breathe in through your nose. Breathe out through either your nose or mouth. · Breathe deeply, filling up the area between your navel and your rib cage. Breathe so that your belly goes up and down. · Do not hold your breath. · Breathe like this for 5 to 10 minutes. Notice the feeling of calmness throughout your whole body.   As you continue to breathe slowly and deeply, relax by doing the following for another 5 to 10 instruction, always ask your healthcare professional. [place] [address] any warranty or liability for your use of this information. Patient Education        Learning About Benefits From Quitting Smoking  How does quitting smoking make you healthier? If you're thinking about quitting smoking, you may have a few reasons to besmoke-free. Your health may be one of them.  When you quit smoking, you lower your risks for cancer, lung disease, heart attack, stroke, blood vessel disease, and blindness from macular degeneration.  When you're smoke-free, you get sick less often, and you heal faster. You are less likely to get colds, flu, bronchitis, and pneumonia.  As a nonsmoker, you may find that your mood is better and you are less stressed. When and how will you feel healthier? Quitting has real health benefits that start from day 1 of being smoke-free. And the longer you stay smoke-free, the healthier you get and the better youfeel. The first hours   After just 20 minutes, your blood pressure and heart rate go down. That means there's less stress on your heart and blood vessels.  Within 12 hours, the level of carbon monoxide in your blood drops back to normal. That makes room for more oxygen. With more oxygen in your body, you may notice that you have more energy than when you smoked. After 2 weeks   Your lungs start to work better.  Your risk of heart attack starts to drop. After 1 month   When your lungs are clear, you cough less and breathe deeper, so it's easier to be active.  Your sense of taste and smell return. That means you can enjoy food more than you have since you started smoking. Over the years   Over the years, your risks of heart disease, heart attack, and stroke are lower.  After 10 years, your risk of dying from lung cancer is cut by about half. And your risk for many other types of cancer is lower too.   How would quitting help others in your life?  When you quit smoking, you improve the health of everyone who now breathes inyour smoke.  Their heart, lung, and cancer risks drop, much like yours.  They are sick less. For babies and small children, living smoke-free means they're less likely to have ear infections, pneumonia, and bronchitis.  If you're a woman who is or will be pregnant someday, quitting smoking means a healthier .  Children who are close to you are less likely to become adult smokers. Where can you learn more? Go to [email or web address] and sign in to your Admetric account. Enter [id number] in the [facility] box to learn more about \"Learning About Benefits From Quitting Smoking. \"     If you do not have an account, please click on the \"Sign Up Now\" link. Current as of: 2021               Content Version: 13.2   Biocontrol. Care instructions adapted under license by [facility] ([facility]). If you have questions about a medical condition or this instruction, always ask your healthcare professional. [place] [address] any warranty or liability for your use of this information. Patient Education        Deciding About Using Medicines To Quit Smoking  How can you decide about using medicines to quit smoking? What are the medicines you can use? Your doctor may prescribe varenicline (Chantix) or bupropion (Zyban). These medicines can help you cope with cravings for tobacco. They are pills thatdon't contain nicotine. You also can use nicotine replacement products. These do contain nicotine. There are many types.  Gum and lozenges slowly release nicotine into your mouth.  Patches stick to your skin. They slowly release nicotine into your bloodstream.   An inhaler has a ashton that contains nicotine. You breathe in a puff of nicotine vapor through your mouth and throat.  Nasal spray releases a mist that contains nicotine.   What are key points about this decision?  Using medicines can double your chances of quitting smoking. They can ease cravings and withdrawal symptoms.  Getting counseling along with using medicine can raise your chances of quitting even more.  If you smoke fewer than 5 cigarettes a day, you may not need medicines to help you quit smoking.  These medicines have less nicotine than cigarettes. And by itself, nicotine is not nearly as harmful as smoking. The tars, carbon monoxide, and other toxic chemicals in tobacco cause the harmful effects.  The side effects of nicotine replacement products depend on the type of product. For example, a patch can make your skin red and itchy. Medicines in pill form can make you sick to your stomach. They can also cause dry mouth and trouble sleeping. For most people, the side effects are not bad enough to make them stop using the products. Why might you choose to use medicines to quit smoking?  You have tried on your own to stop smoking, but you were not able to stop.  You smoke more than 5 cigarettes a day.  You want to increase your chances of quitting smoking.  You want to reduce your cravings and withdrawal symptoms.  You feel the benefits of medicine outweigh the side effects. Why might you choose not to use medicine?  You want to try quitting on your own by stopping all at once (\"cold turkey\").  You want to cut back slowly on the number of cigarettes you smoke.  You smoke fewer than 5 cigarettes a day.  You do not like using medicine.  You feel the side effects of medicines outweigh the benefits.  You are worried about the cost of medicines. Your decision  Thinking about the facts and your feelings can help you make a decision that is right for you. Be sure you understand the benefits and risks of your options,and think about what else you need to do before you make the decision. Where can you learn more? Go to https://heladio.health-Obihai [email or web address] org and sign in to your CAXA account. Enter P758 in the [facility] box to learn more about \"Deciding About Using Medicines To Quit Smoking. \"     If you do not have an account, please click on the \"Sign Up Now\" link. Current as of: October 28, 2021               Content Version: 13.2  © 2006-2022 [employer]. Care instructions adapted under license by [facility] ([facility]). If you have questions about a medical condition or this instruction, always ask your healthcare professional. [place] [address] any warranty or liability for your use of this information. Patient Education        Stopping Smoking: Care Instructions  Your Care Instructions     Cigarette smokers crave the nicotine in cigarettes. Giving it up is much harder than simply changing a habit. Your body has to stop craving the nicotine. It is hard to quit, but you can do it. There are many tools that people use to quitsmoking. You may find that combining tools works best for you. There are several steps to quitting. First you get ready to quit. Then you get support to help you. After that, you learn new skills and behaviors to become anonsmoker. For many people, a necessary step is getting and using medicine. Your doctor will help you set up the plan that best meets your needs. You may want to attend a smoking cessation program to help you quit smoking. When you choose a program, look for one that has proven success. Ask your doctor for ideas. You will greatly increase your chances of success if you take medicineas well as get counseling or join a cessation program.  Some of the changes you feel when you first quit tobacco are uncomfortable. Your body will miss the nicotine at first, and you may feel short-tempered and grumpy. You may have trouble sleeping or concentrating. Medicine can help you deal with these symptoms. You may struggle with changing your smoking habits and rituals.  The last step is the tricky one: Be prepared for the smoking urge to continue for a time. This is a lot to deal with, but keep at it. You willfeel better. Follow-up care is a key part of your treatment and safety. Be sure to make and go to all appointments, and call your doctor if you are having problems. It's also a good idea to know your test results and keep alist of the medicines you take. How can you care for yourself at home?  Ask your family, friends, and coworkers for support. You have a better chance of quitting if you have help and support.  Join a support group, such as Nicotine Anonymous, for people who are trying to quit smoking.  Consider signing up for a smoking cessation program, such as the American Lung Association's Freedom from Smoking program.   Get text messaging support. Go to the website at www.smokefree. gov to sign up for the [facility] program.   Set a quit date. Pick your date carefully so that it is not right in the middle of a big deadline or stressful time. Once you quit, do not even take a puff. Get rid of all ashtrays and lighters after your last cigarette. Clean your house and your clothes so that they do not smell of smoke.  Learn how to be a nonsmoker. Think about ways you can avoid those things that make you reach for a cigarette. ? Avoid situations that put you at greatest risk for smoking. For some people, it is hard to have a drink with friends without smoking. For others, they might skip a coffee break with coworkers who smoke. ? Change your daily routine. Take a different route to work or eat a meal in a different place.  Cut down on stress. Calm yourself or release tension by doing an activity you enjoy, such as reading a book, taking a hot bath, or gardening.  Talk to your doctor or pharmacist about nicotine replacement therapy, which replaces the nicotine in your body.  You still get nicotine but you do not use tobacco. Nicotine replacement products help you slowly reduce the amount of nicotine you need. These products come in several forms, many of them available over-the-counter:  ? Nicotine patches  ? Nicotine gum and lozenges  ? Nicotine inhaler   Ask your doctor about bupropion (Wellbutrin) or varenicline (Chantix), which are prescription medicines. They do not contain nicotine. They help you by reducing withdrawal symptoms, such as stress and anxiety.  Some people find hypnosis, acupuncture, and massage helpful for ending the smoking habit.  Eat a healthy diet and get regular exercise. Having healthy habits will help your body move past its craving for nicotine.  Be prepared to keep trying. Most people are not successful the first few times they try to quit. Do not get mad at yourself if you smoke again. Make a list of things you learned and think about when you want to try again, such as next week, next month, or next year. Where can you learn more? Go to https://SellABandpeEvocalize.Codingpeople. org and sign in to your DataRPM account. Enter U243 in the [employer] box to learn more about \"Stopping Smoking: Care Instructions. \"     If you do not have an account, please click on the \"Sign Up Now\" link. Current as of: October 28, 2021               Content Version: 13.2  © 2006-2022 WebVet. Care instructions adapted under license by [facility] ([facility]). If you have questions about a medical condition or this instruction, always ask your healthcare professional. [place] [address] any warranty or liability for your use of this information. Patient Education        Sleep Studies: About This Test  What is it? Sleep studies are tests that watch what happens to your body during sleep. These studies usually are done in a sleep lab. Sleep labs are often located in hospitals. Sleep studies you do at home can be done with portable equipment. But they may not give the same results as a sleep lab. Why is this test done?   Sleep studies may be done if your sleep is not restful or if you are tired sherron. These studies can help find sleep problems, such as:   Sleep apnea.  Excessive snoring.  Problems staying awake, such as narcolepsy.  Problems with nighttime behaviors. These include sleepwalking, night terrors, bed-wetting, and REM behavior disorders (RBD).  Conditions such as periodic limb movement disorder. This is repeated muscle twitching of the feet, arms, or legs during sleep.  Seizures that occur at night (nocturnal seizures).  Ongoing problems that have not responded to treatment. How do you prepare for the test?   You may be asked to keep a sleep diary before your sleep study.  Don't take any naps on the day of your test.   You may be asked to avoid food or drinks with caffeine during the afternoon and evening before your test.   If the sleep study will be done in a sleep lab, you will be asked to shower or bathe before your test. Don't use sprays, oils, or gels on your hair. Don't wear makeup, fingernail polish, or fake nails. Take a small overnight bag with personal items, such as a toothbrush, a comb, favorite pillows or blankets, and a book. You can wear your own nightclothes.  If you will have portable sleep monitoring, your doctor will explain how to use the equipment at home. How is the test done?  In the sleep lab, you will be in a private room, much like a hotel room.  Small pads or patches called electrodes will be placed on your head and body with a small amount of glue and tape. These will record things like brain activity, eye movement, oxygen levels, and snoring.  Soft elastic belts will be placed around your chest and belly to measure your breathing.  Your blood oxygen levels will be checked by a small clip (oximeter) placed either on the tip of your index finger or on your earlobe.    If you have sleep apnea, you may wear a mask that is connected to a continuous positive airway pressure (CPAP) machine.  Depending on the type of test, you will be allowed to sleep through the night or you'll be awakened periodically and asked to stay awake for a while.  If you use portable sleep monitoring, follow the instructions your doctor gave you. How long does the test take?  You will stay in the sleep lab overnight. For some tests, you will also stay part of the next day. What happens after the test?   You will be able to go home right away.  You may not sleep well during the test and may be tired the next day.  You can go back to your usual activities.  After your sleep problem has been identified, you may need a second study if your doctor orders treatment such as CPAP. Follow-up care is a key part of your treatment and safety. Be sure to make and go to all appointments, and call your doctor if you are having problems. It's also a good idea to keep a list of the medicines youtake. Ask your doctor when you can expect to have your test results. Where can you learn more? Go to https://Avanti Wind Systems.Loopport. org and sign in to your InnerRewards account. Enter H875 in the [employer] box to learn more about \"Sleep Studies: About This Test.\"     If you do not have an account, please click on the \"Sign Up Now\" link. Current as of: July 6, 2021               Content Version: 13.2  © 0907-8520 Healthwise, Incorporated. Care instructions adapted under license by [facility] ([facility]). If you have questions about a medical condition or this instruction, always ask your healthcare professional. [place] [address] any warranty or liability for your use of this information.

## 2022-04-20 ENCOUNTER — TELEPHONE (OUTPATIENT)
Dept: PSYCHIATRY | Age: 67
End: 2022-04-20

## 2022-04-20 DIAGNOSIS — R44.1 HALLUCINATIONS, VISUAL: Primary | ICD-10-CM

## 2022-04-20 NOTE — TELEPHONE ENCOUNTER
Please ask the patient if she is having UTI symptoms such as burning sensation, urgency, and frequency of urination. I am ordering a urine lab specimen I would like her to complete today to rule a UTI out due to her \"hallucinations\". Please let her know, as I told her in the office visit, I don't prescribe benzodiazepines or controlled substances. The Lexapro may take up to 6-8 weeks for full effect, and continue the hydroxyzine up to 3 times per day.  Thank you, Obey Horner

## 2022-04-20 NOTE — TELEPHONE ENCOUNTER
Spoke with patient, she denies any UTI symptoms. I advised that urine culture has been ordered to rule out a UTI due to hallucinations. Patient states \"why, I take those every 3 months\". I asked patient to clarify, she states she frequently gets UTI's and tests every 3 months. Advised that provider would like for her to complete the urine testing to rule out current UTI infection today. States \"no, I can't do it today\". I asked if she would be willing to have testing completed in the next day or two patient states \"yeah maybe\". Advised patient that provider would not be prescribing controlled medications as previously discussed at time of appointment. Advised patient that Lexapro may take up to 6-8 weeks for full effect and to continue the hydroxyzine up to three times daily. Patient states \"no, I'm not taking them anymore\", \"that's what caused the hallucinations\". Patient plans to follow up on 04/28/2022.

## 2022-04-29 ENCOUNTER — TELEMEDICINE (OUTPATIENT)
Dept: PSYCHIATRY | Age: 67
End: 2022-04-29

## 2022-04-29 DIAGNOSIS — F43.12 CHRONIC POST-TRAUMATIC STRESS DISORDER (PTSD): ICD-10-CM

## 2022-04-29 DIAGNOSIS — F51.05 INSOMNIA RELATED TO ANOTHER MENTAL DISORDER: ICD-10-CM

## 2022-04-29 DIAGNOSIS — F32.1 MAJOR DEPRESSIVE DISORDER, SINGLE EPISODE, MODERATE (HCC): Primary | ICD-10-CM

## 2022-04-29 DIAGNOSIS — F41.1 GAD (GENERALIZED ANXIETY DISORDER): ICD-10-CM

## 2022-04-29 PROCEDURE — 99214 OFFICE O/P EST MOD 30 MIN: CPT

## 2022-04-29 RX ORDER — QUETIAPINE FUMARATE 50 MG/1
50 TABLET, EXTENDED RELEASE ORAL NIGHTLY
Qty: 30 TABLET | Refills: 0 | Status: SHIPPED | OUTPATIENT
Start: 2022-04-29 | End: 2022-05-09

## 2022-04-29 ASSESSMENT — ANXIETY QUESTIONNAIRES
3. WORRYING TOO MUCH ABOUT DIFFERENT THINGS: 3
5. BEING SO RESTLESS THAT IT IS HARD TO SIT STILL: 3
6. BECOMING EASILY ANNOYED OR IRRITABLE: 3
1. FEELING NERVOUS, ANXIOUS, OR ON EDGE: 3
4. TROUBLE RELAXING: 3
IF YOU CHECKED OFF ANY PROBLEMS ON THIS QUESTIONNAIRE, HOW DIFFICULT HAVE THESE PROBLEMS MADE IT FOR YOU TO DO YOUR WORK, TAKE CARE OF THINGS AT HOME, OR GET ALONG WITH OTHER PEOPLE: VERY DIFFICULT
2. NOT BEING ABLE TO STOP OR CONTROL WORRYING: 3
7. FEELING AFRAID AS IF SOMETHING AWFUL MIGHT HAPPEN: 3
GAD7 TOTAL SCORE: 21

## 2022-04-29 ASSESSMENT — PATIENT HEALTH QUESTIONNAIRE - PHQ9
SUM OF ALL RESPONSES TO PHQ QUESTIONS 1-9: 21
7. TROUBLE CONCENTRATING ON THINGS, SUCH AS READING THE NEWSPAPER OR WATCHING TELEVISION: 3
SUM OF ALL RESPONSES TO PHQ QUESTIONS 1-9: 21
SUM OF ALL RESPONSES TO PHQ9 QUESTIONS 1 & 2: 6
SUM OF ALL RESPONSES TO PHQ QUESTIONS 1-9: 21
6. FEELING BAD ABOUT YOURSELF - OR THAT YOU ARE A FAILURE OR HAVE LET YOURSELF OR YOUR FAMILY DOWN: 3
4. FEELING TIRED OR HAVING LITTLE ENERGY: 3
SUM OF ALL RESPONSES TO PHQ QUESTIONS 1-9: 21
1. LITTLE INTEREST OR PLEASURE IN DOING THINGS: 3
8. MOVING OR SPEAKING SO SLOWLY THAT OTHER PEOPLE COULD HAVE NOTICED. OR THE OPPOSITE, BEING SO FIGETY OR RESTLESS THAT YOU HAVE BEEN MOVING AROUND A LOT MORE THAN USUAL: 2
10. IF YOU CHECKED OFF ANY PROBLEMS, HOW DIFFICULT HAVE THESE PROBLEMS MADE IT FOR YOU TO DO YOUR WORK, TAKE CARE OF THINGS AT HOME, OR GET ALONG WITH OTHER PEOPLE: 2
5. POOR APPETITE OR OVEREATING: 1
2. FEELING DOWN, DEPRESSED OR HOPELESS: 3
9. THOUGHTS THAT YOU WOULD BE BETTER OFF DEAD, OR OF HURTING YOURSELF: 0
3. TROUBLE FALLING OR STAYING ASLEEP: 3

## 2022-04-29 ASSESSMENT — COLUMBIA-SUICIDE SEVERITY RATING SCALE - C-SSRS
2. HAVE YOU ACTUALLY HAD ANY THOUGHTS OF KILLING YOURSELF?: NO
6. HAVE YOU EVER DONE ANYTHING, STARTED TO DO ANYTHING, OR PREPARED TO DO ANYTHING TO END YOUR LIFE?: NO
1. WITHIN THE PAST MONTH, HAVE YOU WISHED YOU WERE DEAD OR WISHED YOU COULD GO TO SLEEP AND NOT WAKE UP?: YES

## 2022-04-29 NOTE — PATIENT INSTRUCTIONS
Patient Education        Recovering From Depression: Care Instructions  Your Care Instructions     Taking good care of yourself is important as you recover from depression. In time, your symptoms will fade as your treatment takes hold. Do not give up. Instead, focus your energy on getting better. Your mood will improve. It just takes some time. Focus on things that can help you feel better, such as being with friends and family, eating well, and getting enough rest. But take things slowly. Do not do too much too soon. Youwill begin to feel better gradually. Follow-up care is a key part of your treatment and safety. Be sure to make and go to all appointments, and call your doctor if you are having problems. It's also a good idea to know your test results and keep alist of the medicines you take. How can you care for yourself at home? Be realistic   If you have a large task to do, break it up into smaller steps you can handle, and just do what you can.  You may want to put off important decisions until your depression has lifted. If you have plans that will have a major impact on your life, such as marriage, divorce, or a job change, try to wait a bit. Talk it over with friends and loved ones who can help you look at the overall picture first.   Reaching out to people for help is important. Do not isolate yourself. Let your family and friends help you. Find someone you can trust and confide in, and talk to that person.  Be patient, and be kind to yourself. Remember that depression is not your fault and is not something you can overcome with willpower alone. Treatment is important for depression, just like for any other illness. Feeling better takes time, and your mood will improve little by little. Stay active   Stay busy and get outside. Take a walk, or try some other light exercise.  Talk with your doctor about an exercise program. Exercise can help with mild depression.  Go to a movie or concert. Take part in a Alevism activity or other social gathering. Go to a RegBinder game.  Ask a friend to have dinner with you. Take care of yourself   Eat a balanced diet with plenty of fresh fruits and vegetables, whole grains, and lean protein. If you have lost your appetite, eat small snacks rather than large meals.  Avoid using illegal drugs or marijuana and drinking alcohol. Do not take medicines that have not been prescribed for you. They may interfere with medicines you may be taking for depression, or they may make your depression worse.  Take your medicines exactly as they are prescribed. You may start to feel better within 1 to 3 weeks of taking antidepressant medicine. But it can take as many as 6 to 8 weeks to see more improvement. If you have questions or concerns about your medicines, or if you do not notice any improvement by 3 weeks, talk to your doctor.  Continue to take your medicine after your symptoms improve. Taking your medicine for at least 6 months after you feel better can help keep you from getting depressed again. If this isn't the first time you have been depressed, your doctor may recommend you to take medicine even longer.  If you have any side effects from your medicine, tell your doctor. Many side effects are mild and will go away on their own after you have been taking the medicine for a few weeks. Some may last longer. Talk to your doctor if side effects are bothering you too much. You might be able to try a different medicine.  Continue counseling. It may help prevent depression from returning, especially if you've had multiple episodes of depression. Talk with your counselor if you are having a hard time attending your sessions or you think the sessions aren't working. Don't just stop going.  Get enough sleep. Talk to your doctor if you are having problems sleeping.  Avoid sleeping pills unless they are prescribed by the doctor treating your depression.  Sleeping pills may make you groggy during the day, and they may interact with other medicine you are taking.  If you have any other illnesses, such as diabetes, heart disease, or high blood pressure, make sure to continue with your treatment. Tell your doctor about all of the medicines you take, including those with or without a prescription.  If you or someone you know talks about suicide, self-harm, or feeling hopeless, get help right away. Call the [facility] at 1-800-273-talk (0-418.257.3372) or text HOME to 095233 to access the Crisis Text Line. Consider saving these numbers in your phone. When should you call for help? Call 911 anytime you think you may need emergency care. For example, call if:     You feel like hurting yourself or someone else.      Someone you know has depression and is about to attempt or is attempting suicide. Call your doctor now or seek immediate medical care if:     You hear voices.      Someone you know has depression and:  ? Starts to give away his or her possessions. ? Uses illegal drugs or drinks alcohol heavily. ? Talks or writes about death, including writing suicide notes or talking about guns, knives, or pills. ? Starts to spend a lot of time alone. ? Acts very aggressively or suddenly appears calm. Watch closely for changes in your health, and be sure to contact your doctor if:     You do not get better as expected. Where can you learn more? Go to https://MisAbogados.comcarrilloeMoneyUnion.Women of Coffee. org and sign in to your VersionEye account. Enter I924 in the Cluepedia box to learn more about \"Recovering From Depression: Care Instructions. \"     If you do not have an account, please click on the \"Sign Up Now\" link. Current as of: June 16, 2021               Content Version: 13.2  © 6742-8737 Healthwise, Incorporated. Care instructions adapted under license by [facility] ([facility]).  If you have questions about a medical condition or this instruction, always ask your healthcare professional. [place] [address] any warranty or liability for your use of this information. Patient Education        Recovering From Depression: Care Instructions  Your Care Instructions     Taking good care of yourself is important as you recover from depression. In time, your symptoms will fade as your treatment takes hold. Do not give up. Instead, focus your energy on getting better. Your mood will improve. It just takes some time. Focus on things that can help you feel better, such as being with friends and family, eating well, and getting enough rest. But take things slowly. Do not do too much too soon. Youwill begin to feel better gradually. Follow-up care is a key part of your treatment and safety. Be sure to make and go to all appointments, and call your doctor if you are having problems. It's also a good idea to know your test results and keep alist of the medicines you take. How can you care for yourself at home? Be realistic   If you have a large task to do, break it up into smaller steps you can handle, and just do what you can.  You may want to put off important decisions until your depression has lifted. If you have plans that will have a major impact on your life, such as marriage, divorce, or a job change, try to wait a bit. Talk it over with friends and loved ones who can help you look at the overall picture first.   Reaching out to people for help is important. Do not isolate yourself. Let your family and friends help you. Find someone you can trust and confide in, and talk to that person.  Be patient, and be kind to yourself. Remember that depression is not your fault and is not something you can overcome with willpower alone. Treatment is important for depression, just like for any other illness. Feeling better takes time, and your mood will improve little by little. Stay active   Stay busy and get outside.  Take a walk, or try some other light exercise.  Talk with your doctor about an exercise program. Exercise can help with mild depression.  Go to a movie or concert. Take part in a Religion activity or other social gathering. Go to a ball game.  Ask a friend to have dinner with you. Take care of yourself   Eat a balanced diet with plenty of fresh fruits and vegetables, whole grains, and lean protein. If you have lost your appetite, eat small snacks rather than large meals.  Avoid using illegal drugs or marijuana and drinking alcohol. Do not take medicines that have not been prescribed for you. They may interfere with medicines you may be taking for depression, or they may make your depression worse.  Take your medicines exactly as they are prescribed. You may start to feel better within 1 to 3 weeks of taking antidepressant medicine. But it can take as many as 6 to 8 weeks to see more improvement. If you have questions or concerns about your medicines, or if you do not notice any improvement by 3 weeks, talk to your doctor.  Continue to take your medicine after your symptoms improve. Taking your medicine for at least 6 months after you feel better can help keep you from getting depressed again. If this isn't the first time you have been depressed, your doctor may recommend you to take medicine even longer.  If you have any side effects from your medicine, tell your doctor. Many side effects are mild and will go away on their own after you have been taking the medicine for a few weeks. Some may last longer. Talk to your doctor if side effects are bothering you too much. You might be able to try a different medicine.  Continue counseling. It may help prevent depression from returning, especially if you've had multiple episodes of depression. Talk with your counselor if you are having a hard time attending your sessions or you think the sessions aren't working. Don't just stop going.  Get enough sleep.  Talk to your doctor if you are having problems sleeping.  Avoid sleeping pills unless they are prescribed by the doctor treating your depression. Sleeping pills may make you groggy during the day, and they may interact with other medicine you are taking.  If you have any other illnesses, such as diabetes, heart disease, or high blood pressure, make sure to continue with your treatment. Tell your doctor about all of the medicines you take, including those with or without a prescription.  If you or someone you know talks about suicide, self-harm, or feeling hopeless, get help right away. Call the [address] [place] [address] at 1-800-273-talk (0-420.167.5144) or text HOME to 107938 to access the Crisis Text Line. Consider saving these numbers in your phone. When should you call for help? Call 770 anytime you think you may need emergency care. For example, call if:     You feel like hurting yourself or someone else.      Someone you know has depression and is about to attempt or is attempting suicide. Call your doctor now or seek immediate medical care if:     You hear voices.      Someone you know has depression and:  ? Starts to give away his or her possessions. ? Uses illegal drugs or drinks alcohol heavily. ? Talks or writes about death, including writing suicide notes or talking about guns, knives, or pills. ? Starts to spend a lot of time alone. ? Acts very aggressively or suddenly appears calm. Watch closely for changes in your health, and be sure to contact your doctor if:     You do not get better as expected. Where can you learn more? Go to https://NanoPrecision Holding Companysenthil.Wantster. org and sign in to your Kickboard account. Enter O367 in the [facility] box to learn more about \"Recovering From Depression: Care Instructions. \"     If you do not have an account, please click on the \"Sign Up Now\" link. Current as of: June 16, 2021               Content Version: 13.2  © 2435-8351 Healthwise, Lola Pirindola. Care instructions adapted under license by [facility] ([facility]). If you have questions about a medical condition or this instruction, always ask your healthcare professional. [address] any warranty or liability for your use of this information. Patient Education        Learning About Managing Anger  What causes anger? Many things can cause anger: Stress at work or at home. Social situations thatmake you angry. A response to everyday events. Anger signals your body to prepare for a fight. This reaction is often called \"fight or flight. \" When you get angry, adrenaline and other hormones are released into your blood. Then your blood pressure goes up, your heart beatsfaster, and you breathe faster. When you express anger in a healthy way, it can inspire change and make you productive. But if you don't have the skills to express anger in a healthy way, anger can build up. You may hurt othersand yourselfemotionally and even physically. Violent behavior often starts with verbal threats or fairly minor incidents. But over time, it can involve physical harm. It can include physical, verbal, or sexual abuse of an intimate partner (domestic violence), achild (child abuse), or an older adult (elder abuse). It can also make you sick. Anger and constant hostility keep your blood pressure high. They increase your chances of having another health problem, such as depression, a heart attack, or a stroke. Some people withpost-traumatic stress disorder (PTSD) feel angry and on alert all the time. It may feel like there are no other ways to react when you are angry. But when you learn to work with anger in appropriate and healthy ways, your anger nolonger controls you. How can you manage your anger? The first step to managing anger is to be more aware of it. Note the thoughts, feelings, and emotions that you have when you get angry. Practice noticing these signs of anger when you are calm.  If you are more aware of the signs ofanger, you can take steps to manage it. Here are a few tips:   Think before you act. Take time to stop and cool down when you feel yourself getting angry. Count to 10 while you take slow, steady breaths. Practice some other form of mental relaxation.  Learn the feelings that lead to angry outbursts. Anger and hostility may be a symptom of unhappy feelings or depression about your job, your relationship, or other aspects of your personal life.  Avoid situations that trigger your anger. If standing in line bothers you, do errands at less busy times.  Express anger in a healthy way. You might:  ? Go for a short walk or jog.  ? Draw, paint, or listen to music to release the anger. ? Write in a daily journal.  ? Use \"I\" statements, not \"you\" statements, to discuss your anger. Say \"I don't feel valued when my needs are not being met\" instead of \"You make me mad when you are so inconsiderate. \"   Take care of yourself. ? Exercise regularly. ? Eat a balanced diet. Don't skip meals. ? Try to get 8 hours of sleep each night. ? Limit your use of alcohol, and don't use illegal drugs. ? Practice yoga, meditation, or nithya chi to relax.  Explore other resources that may be available through your job or your community. ? Contact your human resources department at work. You might be able to get services through an employee assistance program.  ? Contact your local hospital, mental health facility, or health department. Ask what types of programs or support groups are available in your area.  Do not keep guns in your home. If you must have guns in your home, unload them and lock them up. Lock ammunition in a separate place. Keep guns away from children. Where can you find help? If anger or stress starts to harm your work or personal relationships, youmight seek help. You can learn ways to control your feelings and actions.  Talk to someone you trust, or find a counselor.    There are groups in your area that can connect you with people to talk to. ? [address] . This service from the national Substance Abuse and [address] can help you find local counselors. Search online at [facility]. ABFIT Productsa.gov or call 9-063-289-HELP (613 697 249), or TDD 9-280.249.4967.  ? Parents Anonymous. Self-help groups that serve parents under stress, as well as children who have been abused, are available throughout the [place], [place] ([facility]), and [facility]. To find a group in your area, search online or in your phone book under Parents Anonymous or call (173) 643-0552. Where can you learn more? Go to https://Sports Mogul.Qazzow. org and sign in to your iZ3D account. Enter L315 in the Iptivia box to learn more about \"Learning About Managing Anger. \"     If you do not have an account, please click on the \"Sign Up Now\" link. Current as of: June 16, 2021               Content Version: 13.2  © 4031-8808 Healthwise, Pix4D. Care instructions adapted under license by [facility] ([facility]). If you have questions about a medical condition or this instruction, always ask your healthcare professional. [place] [address] any warranty or liability for your use of this information. Patient Education        Insomnia: Care Instructions  Overview     Insomnia is the inability to sleep well. Insomnia may make it hard for you to get to sleep, stay asleep, or sleep as long as you need to. This can make you tired and grouchy during the day. It can also make you forgetful, lesseffective at work, and unhappy. Insomnia can be linked to many things. These include health problems,medicines, and stressful events. Treatment may include treating problems that may be linked with your insomnia. Treatment also includes behavior and lifestyle changes. This may include cognitive-behavioral therapy for insomnia (CBT-I).  CBT-I uses different ways to help you change your thoughts and behaviors that may interfere with sleep. Your doctor can recommend specific things you can try. Examples include doing relaxation exercises, keeping regular bedtimes and wake times, limiting alcohol, and making healthy sleep habits. Some people decide to take medicinefor a while to help with sleep. Follow-up care is a key part of your treatment and safety. Be sure to make and go to all appointments, and call your doctor if you are having problems. It's also a good idea to know your test results and keep alist of the medicines you take. How can you care for yourself at home? Cognitive-behavioral therapy for insomnia (CBT-I)   If your doctor recommends CBT-I, follow your treatment plan. Your doctor will give you instructions that are unique for you.  Your plan will likely include a few things that you can try at home. For example:  ? Try meditation or other relaxation techniques before you go to bed. ? Go to bed at the same time every night, and wake up at the same time every morning. Do not take naps during the day. ? Do not stay in bed awake for too long. If you can't fall asleep, or if you wake up in the middle of the night and can't get back to sleep within about 15 to 20 minutes, get out of bed and go to another room until you feel sleepy. ? If watching the clock makes you anxious, turn it facing away from you so you cannot see the time. ? If you worry when you lie down, start a worry book. Well before bedtime, write down your worries, and then set the book and your concerns aside. Healthy sleep habits   If your doctor recommends it, try making healthy sleep habits. For example:  ? Keep your bedroom quiet, dark, and cool. ? Do not have drinks with caffeine, such as coffee or black tea, for 8 hours before bed. ? Do not smoke or use other types of tobacco near bedtime. Nicotine is a stimulant and can keep you awake. ?  Avoid drinking alcohol late in the evening, because it can cause you to wake in the middle of the night. ? Do not eat a big meal close to bedtime. If you are hungry, eat a light snack. ? Do not drink a lot of water close to bedtime, because the need to urinate may wake you up during the night. ? Do not read, watch TV, or use your phone in bed. Use the bed only for sleeping and sex. Medicine   Be safe with medicines. Take your medicines exactly as prescribed. Call your doctor if you think you are having a problem with your medicine.  Talk with your doctor before you try an over-the-counter medicine, herbal product, or supplement to try to improve your sleep. Your doctor can recommend how much to take and when to take it. Make sure your doctor knows all of the medicines, vitamins, herbal products, and supplements you take.  You will get more details on the specific medicines your doctor prescribes. When should you call for help? Watch closely for changes in your health, and be sure to contact your doctor if:     Your efforts to improve your sleep do not work.      Your insomnia gets worse.      You have been feeling down, depressed, or hopeless or have lost interest in things that you usually enjoy. Where can you learn more? Go to https://Fortegra FinancialpeCobalt Technologies.FantasyHub. org and sign in to your Whatâ€™s More Alive Than You account. Enter P513 in the MyCheck box to learn more about \"Insomnia: Care Instructions. \"     If you do not have an account, please click on the \"Sign Up Now\" link. Current as of: June 16, 2021               Content Version: 13.2  © 2006-2022 Healthwise, Incorporated. Care instructions adapted under license by [facility] ([facility]). If you have questions about a medical condition or this instruction, always ask your healthcare professional. [place] [address] any warranty or liability for your use of this information.          Patient Education        Suicidal Thoughts and Behavior: Care Instructions  Overview  You have been seen by a doctor because you've had thoughts of suicide or have harmed yourself. Your doctor and support team want to help keep you safe. Yourteam may include a , a , and a counselor. People often think about suicide because they feel hopeless, helpless, or worthless. These feelings may come from having a mental health problem, such asdepression. These problems can be treated. It's important to remember that there are people who care about you. Your doctor and support team take your pain very seriously, and they want to help. Treatment and close follow-up care can help you feel better. Follow-up care is a key part of your treatment and safety. Be sure to make and go to all appointments, and call your doctor if you are having problems. It's also a good idea to know your test results and keep alist of the medicines you take. How can you care for yourself at home?  Talk to someone. Be open about your feelings. Reach out to a trusted family member or friend, your doctor, or a counselor. You can also call the [address] [place] [address] at 1-800-273-talk (6-572.925.8288). Or text HOME to 353301 to access the Crisis Text Line. Consider saving these numbers in your phone.  Attend all counseling sessions recommended by your doctor.  Make a suicide safety plan. This is a set of steps you can take when you feel suicidal. It includes your warning signs, coping strategies, and people you can ask for support. It's best to work with a therapist to make your plan.  Ask someone to remove and store any guns, pills, or other means of suicide.  Avoid alcohol and drug use.  Be safe with medicines. Take your medicines exactly as prescribed. Call your doctor if you think you are having a problem with your medicine. When should you call for help? Call 911 anytime you think you may need emergency care.  For example, call if:     You feel you cannot stop from hurting yourself or someone else.   Call your doctor now or seek immediate medical care if:     You have one or more warning signs of suicide. For example, call if:  ? You feel like giving away your possessions. ? You use illegal drugs or drink alcohol heavily. ? You talk or write about death. This may include writing suicide notes and talking about guns, knives, or pills. ? You start to spend a lot of time alone or spend more time alone than usual.      You hear voices.      You start acting in an aggressive way that's not normal for you. Watch closely for changes in your health, and be sure to contact your doctor ifyou have any problems. Where can you learn more? Go to [email or web address].[email or web address]. org and sign in to your [employer] account. Enter N176 in the [employer] box to learn more about \"Suicidal Thoughts and Behavior: Care Instructions. \"     If you do not have an account, please click on the \"Sign Up Now\" link. Current as of: June 16, 2021               Content Version: 13.2  © 2006-2022 [employer]. Care instructions adapted under license by [facility] [place] ([facility]). If you have questions about a medical condition or this instruction, always ask your healthcare professional. [place] [address] any warranty or liability for your use of this information.

## 2022-05-09 ENCOUNTER — TELEPHONE (OUTPATIENT)
Dept: PSYCHIATRY | Age: 67
End: 2022-05-09

## 2022-05-09 ENCOUNTER — SCHEDULED TELEPHONE ENCOUNTER (OUTPATIENT)
Dept: PSYCHIATRY | Age: 67
End: 2022-05-09

## 2022-05-09 DIAGNOSIS — F41.1 GAD (GENERALIZED ANXIETY DISORDER): ICD-10-CM

## 2022-05-09 DIAGNOSIS — F51.05 INSOMNIA RELATED TO ANOTHER MENTAL DISORDER: ICD-10-CM

## 2022-05-09 DIAGNOSIS — F32.1 MAJOR DEPRESSIVE DISORDER, SINGLE EPISODE, MODERATE (HCC): Primary | ICD-10-CM

## 2022-05-09 DIAGNOSIS — F43.12 CHRONIC POST-TRAUMATIC STRESS DISORDER (PTSD): ICD-10-CM

## 2022-05-09 PROCEDURE — 99443 PR PHYS/QHP TELEPHONE EVALUATION 21-30 MIN: CPT

## 2022-05-09 RX ORDER — QUETIAPINE FUMARATE 50 MG/1
50 TABLET, FILM COATED ORAL 2 TIMES DAILY
Qty: 60 TABLET | Refills: 0 | Status: SHIPPED | OUTPATIENT
Start: 2022-05-09 | End: 2022-05-09 | Stop reason: CLARIF

## 2022-05-09 RX ORDER — QUETIAPINE FUMARATE 50 MG/1
50 TABLET, FILM COATED ORAL 2 TIMES DAILY
Qty: 60 TABLET | Refills: 0 | Status: CANCELLED
Start: 2022-05-09

## 2022-05-09 RX ORDER — QUETIAPINE 150 MG/1
150 TABLET, FILM COATED, EXTENDED RELEASE ORAL DAILY
Qty: 30 TABLET | Refills: 0 | Status: SHIPPED | OUTPATIENT
Start: 2022-05-09 | End: 2022-05-20

## 2022-05-09 ASSESSMENT — ANXIETY QUESTIONNAIRES
1. FEELING NERVOUS, ANXIOUS, OR ON EDGE: 3
5. BEING SO RESTLESS THAT IT IS HARD TO SIT STILL: 3
GAD7 TOTAL SCORE: 21
6. BECOMING EASILY ANNOYED OR IRRITABLE: 3
7. FEELING AFRAID AS IF SOMETHING AWFUL MIGHT HAPPEN: 3
3. WORRYING TOO MUCH ABOUT DIFFERENT THINGS: 3
2. NOT BEING ABLE TO STOP OR CONTROL WORRYING: 3
4. TROUBLE RELAXING: 3
IF YOU CHECKED OFF ANY PROBLEMS ON THIS QUESTIONNAIRE, HOW DIFFICULT HAVE THESE PROBLEMS MADE IT FOR YOU TO DO YOUR WORK, TAKE CARE OF THINGS AT HOME, OR GET ALONG WITH OTHER PEOPLE: EXTREMELY DIFFICULT

## 2022-05-09 ASSESSMENT — PATIENT HEALTH QUESTIONNAIRE - PHQ9
5. POOR APPETITE OR OVEREATING: 1
1. LITTLE INTEREST OR PLEASURE IN DOING THINGS: 3
10. IF YOU CHECKED OFF ANY PROBLEMS, HOW DIFFICULT HAVE THESE PROBLEMS MADE IT FOR YOU TO DO YOUR WORK, TAKE CARE OF THINGS AT HOME, OR GET ALONG WITH OTHER PEOPLE: 3
SUM OF ALL RESPONSES TO PHQ9 QUESTIONS 1 & 2: 5
8. MOVING OR SPEAKING SO SLOWLY THAT OTHER PEOPLE COULD HAVE NOTICED. OR THE OPPOSITE, BEING SO FIGETY OR RESTLESS THAT YOU HAVE BEEN MOVING AROUND A LOT MORE THAN USUAL: 2
SUM OF ALL RESPONSES TO PHQ QUESTIONS 1-9: 19
7. TROUBLE CONCENTRATING ON THINGS, SUCH AS READING THE NEWSPAPER OR WATCHING TELEVISION: 2
2. FEELING DOWN, DEPRESSED OR HOPELESS: 2
SUM OF ALL RESPONSES TO PHQ QUESTIONS 1-9: 19
4. FEELING TIRED OR HAVING LITTLE ENERGY: 3
6. FEELING BAD ABOUT YOURSELF - OR THAT YOU ARE A FAILURE OR HAVE LET YOURSELF OR YOUR FAMILY DOWN: 3
3. TROUBLE FALLING OR STAYING ASLEEP: 3
9. THOUGHTS THAT YOU WOULD BE BETTER OFF DEAD, OR OF HURTING YOURSELF: 0

## 2022-05-09 ASSESSMENT — COLUMBIA-SUICIDE SEVERITY RATING SCALE - C-SSRS
2. HAVE YOU ACTUALLY HAD ANY THOUGHTS OF KILLING YOURSELF?: NO
1. WITHIN THE PAST MONTH, HAVE YOU WISHED YOU WERE DEAD OR WISHED YOU COULD GO TO SLEEP AND NOT WAKE UP?: NO
6. HAVE YOU EVER DONE ANYTHING, STARTED TO DO ANYTHING, OR PREPARED TO DO ANYTHING TO END YOUR LIFE?: NO

## 2022-05-09 NOTE — PATIENT INSTRUCTIONS
Patient Education        Anxiety Disorder: Care Instructions  Your Care Instructions     Anxiety is a normal reaction to stress. Difficult situations can cause you to have symptoms such as sweaty palms and a nervous feeling. In an anxiety disorder, the symptoms are far more severe. Constant worry, muscle tension, trouble sleeping, nausea and diarrhea, and other symptoms can make normal daily activities difficult or impossible. These symptoms may occur for no reason, and they can affect your work, school, or social life. Medicines, counseling, and self-care can all help. Follow-up care is a key part of your treatment and safety. Be sure to make and go to all appointments, and call your doctor if you are having problems. It's also a good idea to know your test results and keep a list of the medicines you take. How can you care for yourself at home? · Take medicines exactly as directed. Call your doctor if you think you are having a problem with your medicine. · Go to your counseling sessions and follow-up appointments. · Recognize and accept your anxiety. Then, when you are in a situation that makes you anxious, say to yourself, \"This is not an emergency. I feel uncomfortable, but I am not in danger. I can keep going even if I feel anxious. \"  · Be kind to your body:  ? Relieve tension with exercise or a massage. ? Get enough rest.  ? Avoid alcohol, caffeine, nicotine, and illegal drugs. They can increase your anxiety level and cause sleep problems. ? Learn and do relaxation techniques. See below for more about these techniques. · Engage your mind. Get out and do something you enjoy. Go to a funny movie, or take a walk or hike. Plan your day. Having too much or too little to do can make you anxious. · Keep a record of your symptoms. Discuss your fears with a good friend or family member, or join a support group for people with similar problems. Talking to others sometimes relieves stress.   · Get involved in social groups, or volunteer to help others. Being alone sometimes makes things seem worse than they are. · Get at least 30 minutes of exercise on most days of the week to relieve stress. Walking is a good choice. You also may want to do other activities, such as running, swimming, cycling, or playing tennis or team sports. Relaxation techniques  Do relaxation exercises 10 to 20 minutes a day. You can play soothing, relaxing music while you do them, if you wish. · Tell others in your house that you are going to do your relaxation exercises. Ask them not to disturb you. · Find a comfortable place, away from all distractions and noise. · Lie down on your back, or sit with your back straight. · Focus on your breathing. Make it slow and steady. · Breathe in through your nose. Breathe out through either your nose or mouth. · Breathe deeply, filling up the area between your navel and your rib cage. Breathe so that your belly goes up and down. · Do not hold your breath. · Breathe like this for 5 to 10 minutes. Notice the feeling of calmness throughout your whole body. As you continue to breathe slowly and deeply, relax by doing the following for another 5 to 10 minutes:  · Tighten and relax each muscle group in your body. You can begin at your toes and work your way up to your head. · Imagine your muscle groups relaxing and becoming heavy. · Empty your mind of all thoughts. · Let yourself relax more and more deeply. · Become aware of the state of calmness that surrounds you. · When your relaxation time is over, you can bring yourself back to alertness by moving your fingers and toes and then your hands and feet and then stretching and moving your entire body. Sometimes people fall asleep during relaxation, but they usually wake up shortly afterward. · Always give yourself time to return to full alertness before you drive a car or do anything that might cause an accident if you are not fully alert.  Never play a relaxation tape while you drive a car. When should you call for help? Call 911 anytime you think you may need emergency care. For example, call if:    · You feel you cannot stop from hurting yourself or someone else. Keep the numbers for these national suicide hotlines: 2-131-003-TALK (6-975.114.8032) and 5-565-PSPJTKR (4-609.702.6588). If you or someone you know talks about suicide or feeling hopeless, get help right away. Watch closely for changes in your health, and be sure to contact your doctor if:    · You have anxiety or fear that affects your life.     · You have symptoms of anxiety that are new or different from those you had before. Where can you learn more? Go to https://SongFlame.Technologie BiolActis. org and sign in to your Healionics account. Enter P754 in the MOAEC box to learn more about \"Anxiety Disorder: Care Instructions. \"     If you do not have an account, please click on the \"Sign Up Now\" link. Current as of: September 23, 2020               Content Version: 12.9  © 2006-2021 [employer]. Care instructions adapted under license by [facility] ([facility]). If you have questions about a medical condition or this instruction, always ask your healthcare professional. [address] any warranty or liability for your use of this information. Patient Education        Recovering From Depression: Care Instructions  Your Care Instructions     Taking good care of yourself is important as you recover from depression. In time, your symptoms will fade as your treatment takes hold. Do not give up. Instead, focus your energy on getting better. Your mood will improve. It just takes some time. Focus on things that can help you feel better, such as being with friends and family, eating well, and getting enough rest. But take things slowly. Do not do too much too soon. Youwill begin to feel better gradually.   Follow-up care is a key part of your treatment and safety. Be sure to make and go to all appointments, and call your doctor if you are having problems. It's also a good idea to know your test results and keep alist of the medicines you take. How can you care for yourself at home? Be realistic   If you have a large task to do, break it up into smaller steps you can handle, and just do what you can.  You may want to put off important decisions until your depression has lifted. If you have plans that will have a major impact on your life, such as marriage, divorce, or a job change, try to wait a bit. Talk it over with friends and loved ones who can help you look at the overall picture first.   Reaching out to people for help is important. Do not isolate yourself. Let your family and friends help you. Find someone you can trust and confide in, and talk to that person.  Be patient, and be kind to yourself. Remember that depression is not your fault and is not something you can overcome with willpower alone. Treatment is important for depression, just like for any other illness. Feeling better takes time, and your mood will improve little by little. Stay active   Stay busy and get outside. Take a walk, or try some other light exercise.  Talk with your doctor about an exercise program. Exercise can help with mild depression.  Go to a movie or concert. Take part in a Jewish activity or other social gathering. Go to a ball game.  Ask a friend to have dinner with you. Take care of yourself   Eat a balanced diet with plenty of fresh fruits and vegetables, whole grains, and lean protein. If you have lost your appetite, eat small snacks rather than large meals.  Avoid using illegal drugs or marijuana and drinking alcohol. Do not take medicines that have not been prescribed for you. They may interfere with medicines you may be taking for depression, or they may make your depression worse.  Take your medicines exactly as they are prescribed.  You may start to feel better within 1 to 3 weeks of taking antidepressant medicine. But it can take as many as 6 to 8 weeks to see more improvement. If you have questions or concerns about your medicines, or if you do not notice any improvement by 3 weeks, talk to your doctor.  Continue to take your medicine after your symptoms improve. Taking your medicine for at least 6 months after you feel better can help keep you from getting depressed again. If this isn't the first time you have been depressed, your doctor may recommend you to take medicine even longer.  If you have any side effects from your medicine, tell your doctor. Many side effects are mild and will go away on their own after you have been taking the medicine for a few weeks. Some may last longer. Talk to your doctor if side effects are bothering you too much. You might be able to try a different medicine.  Continue counseling. It may help prevent depression from returning, especially if you've had multiple episodes of depression. Talk with your counselor if you are having a hard time attending your sessions or you think the sessions aren't working. Don't just stop going.  Get enough sleep. Talk to your doctor if you are having problems sleeping.  Avoid sleeping pills unless they are prescribed by the doctor treating your depression. Sleeping pills may make you groggy during the day, and they may interact with other medicine you are taking.  If you have any other illnesses, such as diabetes, heart disease, or high blood pressure, make sure to continue with your treatment. Tell your doctor about all of the medicines you take, including those with or without a prescription.  If you or someone you know talks about suicide, self-harm, or feeling hopeless, get help right away. Call the [facility] at 1-800-273-talk (5-985.836.5388) or text HOME to 354978 to access the Crisis Text Line.  Consider saving these numbers in your phone.  When should you call for help? Call 911 anytime you think you may need emergency care. For example, call if:     You feel like hurting yourself or someone else.      Someone you know has depression and is about to attempt or is attempting suicide. Call your doctor now or seek immediate medical care if:     You hear voices.      Someone you know has depression and:  ? Starts to give away his or her possessions. ? Uses illegal drugs or drinks alcohol heavily. ? Talks or writes about death, including writing suicide notes or talking about guns, knives, or pills. ? Starts to spend a lot of time alone. ? Acts very aggressively or suddenly appears calm. Watch closely for changes in your health, and be sure to contact your doctor if:     You do not get better as expected. Where can you learn more? Go to https://chcarrilloeb.DebtLESS Community. org and sign in to your Volar Video account. Enter I776 in the Rococo Software box to learn more about \"Recovering From Depression: Care Instructions. \"     If you do not have an account, please click on the \"Sign Up Now\" link. Current as of: June 16, 2021               Content Version: 13.2  © 1196-3764 [employer]. Care instructions adapted under license by [facility] ([facility]). If you have questions about a medical condition or this instruction, always ask your healthcare professional. [place] [address] any warranty or liability for your use of this information. Patient Education        Insomnia: Care Instructions  Overview     Insomnia is the inability to sleep well. Insomnia may make it hard for you to get to sleep, stay asleep, or sleep as long as you need to. This can make you tired and grouchy during the day. It can also make you forgetful, lesseffective at work, and unhappy. Insomnia can be linked to many things. These include health problems,medicines, and stressful events.   Treatment may include treating problems that may be linked with your insomnia. Treatment also includes behavior and lifestyle changes. This may include cognitive-behavioral therapy for insomnia (CBT-I). CBT-I uses different ways to help you change your thoughts and behaviors that may interfere with sleep. Your doctor can recommend specific things you can try. Examples include doing relaxation exercises, keeping regular bedtimes and wake times, limiting alcohol, and making healthy sleep habits. Some people decide to take medicinefor a while to help with sleep. Follow-up care is a key part of your treatment and safety. Be sure to make and go to all appointments, and call your doctor if you are having problems. It's also a good idea to know your test results and keep alist of the medicines you take. How can you care for yourself at home? Cognitive-behavioral therapy for insomnia (CBT-I)   If your doctor recommends CBT-I, follow your treatment plan. Your doctor will give you instructions that are unique for you.  Your plan will likely include a few things that you can try at home. For example:  ? Try meditation or other relaxation techniques before you go to bed. ? Go to bed at the same time every night, and wake up at the same time every morning. Do not take naps during the day. ? Do not stay in bed awake for too long. If you can't fall asleep, or if you wake up in the middle of the night and can't get back to sleep within about 15 to 20 minutes, get out of bed and go to another room until you feel sleepy. ? If watching the clock makes you anxious, turn it facing away from you so you cannot see the time. ? If you worry when you lie down, start a worry book. Well before bedtime, write down your worries, and then set the book and your concerns aside. Healthy sleep habits   If your doctor recommends it, try making healthy sleep habits. For example:  ? Keep your bedroom quiet, dark, and cool.   ? Do not have drinks with caffeine, such as coffee or black tea, for 8 hours before bed. ? Do not smoke or use other types of tobacco near bedtime. Nicotine is a stimulant and can keep you awake. ? Avoid drinking alcohol late in the evening, because it can cause you to wake in the middle of the night. ? Do not eat a big meal close to bedtime. If you are hungry, eat a light snack. ? Do not drink a lot of water close to bedtime, because the need to urinate may wake you up during the night. ? Do not read, watch TV, or use your phone in bed. Use the bed only for sleeping and sex. Medicine   Be safe with medicines. Take your medicines exactly as prescribed. Call your doctor if you think you are having a problem with your medicine.  Talk with your doctor before you try an over-the-counter medicine, herbal product, or supplement to try to improve your sleep. Your doctor can recommend how much to take and when to take it. Make sure your doctor knows all of the medicines, vitamins, herbal products, and supplements you take.  You will get more details on the specific medicines your doctor prescribes. When should you call for help? Watch closely for changes in your health, and be sure to contact your doctor if:     Your efforts to improve your sleep do not work.      Your insomnia gets worse.      You have been feeling down, depressed, or hopeless or have lost interest in things that you usually enjoy. Where can you learn more? Go to https://Reachpod - Inovaktif BilisimpealfredRue La La.Quick Hang. org and sign in to your LatamLeap account. Enter P513 in the [employer] box to learn more about \"Insomnia: Care Instructions. \"     If you do not have an account, please click on the \"Sign Up Now\" link. Current as of: June 16, 2021               Content Version: 13.2  © 5617-5330 Healthwise, Incorporated. Care instructions adapted under license by [facility] ([facility]).  If you have questions about a medical condition or this instruction, always ask your healthcare professional. Karol Pérez, Incorporated disclaims any warranty or liability for your use of this information. Patient Education        Suicidal Thoughts and Behavior: Care Instructions  Overview  You have been seen by a doctor because you've had thoughts of suicide or have harmed yourself. Your doctor and support team want to help keep you safe. Yourteam may include a , a , and a counselor. People often think about suicide because they feel hopeless, helpless, or worthless. These feelings may come from having a mental health problem, such asdepression. These problems can be treated. It's important to remember that there are people who care about you. Your doctor and support team take your pain very seriously, and they want to help. Treatment and close follow-up care can help you feel better. Follow-up care is a key part of your treatment and safety. Be sure to make and go to all appointments, and call your doctor if you are having problems. It's also a good idea to know your test results and keep alist of the medicines you take. How can you care for yourself at home?  Talk to someone. Be open about your feelings. Reach out to a trusted family member or friend, your doctor, or a counselor. You can also call the [address] [place] [address] at 1-800-273-talk (4-863.321.6090). Or text HOME to 821671 to access the [employer] Text Line. Consider saving these numbers in your phone.  Attend all counseling sessions recommended by your doctor.  Make a suicide safety plan. This is a set of steps you can take when you feel suicidal. It includes your warning signs, coping strategies, and people you can ask for support. It's best to work with a therapist to make your plan.  Ask someone to remove and store any guns, pills, or other means of suicide.  Avoid alcohol and drug use.  Be safe with medicines. Take your medicines exactly as prescribed.  Call your doctor if you think you are having a problem with your medicine. When should you call for help? Call 911 anytime you think you may need emergency care. For example, call if:     You feel you cannot stop from hurting yourself or someone else. Call your doctor now or seek immediate medical care if:     You have one or more warning signs of suicide. For example, call if:  ? You feel like giving away your possessions. ? You use illegal drugs or drink alcohol heavily. ? You talk or write about death. This may include writing suicide notes and talking about guns, knives, or pills. ? You start to spend a lot of time alone or spend more time alone than usual.      You hear voices.      You start acting in an aggressive way that's not normal for you. Watch closely for changes in your health, and be sure to contact your doctor ifyou have any problems. Where can you learn more? Go to https://DreamisepeLIN TV.Rubicon Project. org and sign in to your Theorem account. Enter R153 in the SpotOnWay box to learn more about \"Suicidal Thoughts and Behavior: Care Instructions. \"     If you do not have an account, please click on the \"Sign Up Now\" link. Current as of: June 16, 2021               Content Version: 13.2  © 1593-5953 Healthwise, Incorporated. Care instructions adapted under license by [facility] ([facility]). If you have questions about a medical condition or this instruction, always ask your healthcare professional. [place] [address] any warranty or liability for your use of this information. Patient Education        Learning About Making a Suicide Safety Plan  Overview     A safety plan is a set of steps you can take when you feel suicidal. It includes your warning signs, coping strategies, and people to ask for support. You can write your own safety plan or use a free phone ed. But it's best towork with a therapist to make your plan. How can you make and use your plan?   If you feel like you can't keep from hurting yourself or someone else, get help right away. Call 911 or the National Suicide Prevention Lifeline: 7-167-201-TALK (8-524.754.7366). Or text HOME to 442965 to access the Crisis Text Line. Having a safety plan is important for anyone who thinks about suicide. It can help you (or someone you care about) get safely through times of crisis. If possible, try to make your plan during a time when you aren't in a crisis soit's ready when you need it. To use the plan, move through it step-by-step. So first, check your warning signs. Then try your own coping strategies. If those don't help, move through the rest of the steps until you have the help you need to get through thecrisis. Here's how to make a safety plan. 1. Make a list of your crisis warning signs. What happens when you start to think about suicide? Make a list of your warningsignsthe things you think, feel, or do when you start to feel suicidal.  2. List your personal coping strategies. What can you do or think about to avoid acting when you feel suicidal? This may include your reason(s) to live. Rank these ideas by how well you think they'll work. What might keep you from using them? What might make you more likely touse them? 3. Come up with some sources of support and distraction. Think of people and places that could help shift your attention away from painful feelings or thoughts of dying. This may include children you care aboutor a safe social space, like a coffee shop or a bookstore. 4. Make a list of people you can count on for help. Think about who you could contact in a crisis. Who do you trust and confide in? Who is always there when you need them? This might be a friend, a family member, or someone else, like a caregiver or . If no one comes to mind, that's okay. Be sure you have some professional support, such as a doctor orcounselor. 5. List your professional sources of support.    This may include your doctor or therapist, local emergency rooms, and local crisis hotlines. Also include the National Suicide Prevention Lifeline: Call 6-770.777.9406 or text HOME to 186804. Be sure to save these numbers in yourphone. 6. Think through ways to keep yourself safe. Do you have weapons or other means to hurt yourself? Consider how to limit your access to them. For example, if you have a gun, maybe you could ask a friend orfamily member to lock it up for you. When should you call for help? Call 911 anytime you think you may need emergency care. For example, call if:     You feel you cannot stop from hurting yourself or someone else. Call your doctor now or seek immediate medical care if:     You have one or more warning signs of suicide. For example, call if:  ? You feel like giving away your possessions. ? You use illegal drugs or drink alcohol heavily. ? You talk or write about death. This may include writing suicide notes and talking about guns, knives, or pills. ? You start to spend a lot of time alone or spend more time alone than usual.      You hear voices.      You start acting in an aggressive way that's not normal for you. Watch closely for changes in your health, and be sure to contact your doctor ifyou have any problems. Where can you learn more? Go to https://Purchext.Alces Technology. org and sign in to your Carsabi account. Enter l123 in the [facility] box to learn more about \"Learning About Making a Suicide Safety Plan. \"     If you do not have an account, please click on the \"Sign Up Now\" link. Current as of: June 17, 2021               Content Version: 13.2  © 8315-4138 Healthwise, Incorporated. Care instructions adapted under license by [facility] ([facility]). If you have questions about a medical condition or this instruction, always ask your healthcare professional. [address] any warranty or liability for your use of this information.

## 2022-05-09 NOTE — PROGRESS NOTES
[address] [facility] [address] [facility] [address]  Dept: [phone]  Dept Fax: [phone]: [phone]    Visit Date: 5/9/2022  Telehealth location  Patient Location: their home  [address] [place] [address] Nurse Practitioner Location: home, [facility]  This visit was conducted via phone call due to patient not being able to get onto the video visit, technical difficulties. SUBJECTIVE DATA       CHIEF COMPLAINT:    Chief Complaint   Patient presents with    Depression    Anxiety    Medication Check    Follow-up    Stress        History obtained from: patient    HISTORY OF PRESENT ILLNESS:    Bairon Jennings is a 61 y.o. female who presents to the office for follow up with medication management. HPI   Patient sounding restless, requesting klonopin as she stated \"that worked in the past\". She stated she is not able to fall asleep or stay asleep, and not feeling rested. Only sleeping 1-2 hours per night. Patient continues to refuse Trazodone, Hydroxyzine, and Buspirone. Explained to patient that Klonopin is a benzodiazepine and can lead to misuse and addiction, and can increase risk of overdose and death. Patient verbalized understanding, however stating \"this is the only thing that helps\". Patient endorses feeling hopelessness, helplessness, and worthlessness. Patient endorses lack of concentration, motivation, energy, and interest. Patient stated her appetite is good. Patient rates depression today 8/10 and anxiety 9/10 on a scale of 0-10 with 10 being the worst. Patient denies suicidal or homicidal ideation, plan, or intent. Patient stated the Seroquel is helping a little with depression and anxiety. Discussed continuing to increase Seroquel for management of anxiety and depression. Patient verbalized understanding. Patient contracts for safety with her boyfriend. Patient denies auditory or visual hallucinations.      No current labs on file. Encouraged patient to get these drawn. New medical conditions or psychiatric admissions since seen by this provider last? Denies  Patient endorses currently taking the following psychiatric medications: Seroquel 50 mg nightly  Adverse reactions from psychotropic medications:  Denies  Patient Assets/Support system:   Boyfriend   Endorsed coping skills: talk with boyfriend, listen to music  The patient endorses feeling safe at home Yes  Current Substance Use: Denies    Motivational Interviewing and Cognitive Behavioral Therapy utilized, including behavioral modification, insight oriented, and supportive therapy. Patient is encouraged to utilize nonpharmacologic coping skills such as deep breathing, guided imagery, guided meditation, muscle relaxation, calming music, and/or journaling. Records review of communications , labs, and medications from an internal/external source completed. ALLERGIES:    Patient has no known allergies. PAST MEDICAL HISTORY:    Past Medical History:   Diagnosis Date    Anxiety     COPD (chronic obstructive pulmonary disease) ([facility] [address]     Hypertension     Osteoporosis     Thyroid disease        PREVIOUSMEDICATIONS:  Outpatient Medications Prior to Visit   Medication Sig Dispense Refill    QUEtiapine (SEROQUEL XR) 50 MG extended release tablet Take 1 tablet by mouth nightly 30 tablet 0    metoprolol tartrate (LOPRESSOR) 50 MG tablet Take 50 mg by mouth 2 times daily      levothyroxine (SYNTHROID) 75 MCG tablet Take 75 mcg by mouth Daily       No facility-administered medications prior to visit. REVIEW OF SYSTEMS:    Review of Systems   Psychiatric/Behavioral: Positive for agitation, decreased concentration, dysphoric mood and sleep disturbance. The patient is nervous/anxious. All other systems reviewed and are negative. The patient sees Thomas Ferrari as her primary care provider.     OBJECTIVE DATA     Wt Readings from Last 3 Encounters: 04/14/22 184 lb 6.4 oz (83.6 kg)   09/12/18 122 lb (55.3 kg)   07/26/18 120 lb (54.4 kg)        Mental Status Exam:   Level of consciousness:  within normal limits  Appearance:  in chair and fair grooming    Behavior/Motor:  psychomotor agitation  Attitude toward examiner:  cooperative, poor eye contact and easily distracted  Speech:  spontaneous, normal rate and normal volume  Mood:  \"not good\" per patient     Affect:  mood congruent, flat, anxious, irritable  Thought processes:  linear, goal directed, coherent and poverty of thought  Thought content:  Denies homicidal ideation  Suicidal Ideation:  denies suicidal ideation  Delusions:  no evidence of delusions  Perceptual Disturbance:  denies any perceptual disturbance  Cognition: Patient is oriented to person, place, time and situation  Concentration: poor  Memory: limited  Insight & Judgement: limited   Medication Side Effects: denies  Attention Span: Attention span and concentration were age appropriate    Screenings Completed in This Encounter:     Anxiety and Depression:      BETY-7 SCREENING 5/9/2022 4/29/2022   Feeling nervous, anxious, or on edge Nearly every day Nearly every day   Not being able to stop or control worrying Nearly every day Nearly every day   Worrying too much about different things Nearly every day Nearly every day   Trouble relaxing Nearly every day Nearly every day   Being so restless that it is hard to sit still Nearly every day Nearly every day   Becoming easily annoyed or irritable Nearly every day Nearly every day   Feeling afraid as if something awful might happen Nearly every day Nearly every day   BETY-7 Total Score 21 21   How difficult have these problems made it for you to do your work, take care of things at home, or get along with other people? Extremely difficult Very difficult           PHQ-2 Over the past 2 weeks, how often have you been bothered by any of the following problems?   Little interest or pleasure in doing things: Nearly every day  Feeling down, depressed, or hopeless: More than half the days  PHQ-2 Score: 5  PHQ-9 Over the past 2 weeks, how often have you been bothered by any of the following problems? Trouble falling or staying asleep, or sleeping too much: Nearly every day  Feeling tired or having little energy: Nearly every day  Poor appetite or overeating: Several days  Feeling bad about yourself - or that you are a failure or have let yourself or your family down: Nearly every day  Trouble concentrating on things, such as reading the newspaper or watching television: More than half the days  Moving or speaking so slowly that other people could have noticed. Or the opposite - being so fidgety or restless that you have been moving around a lot more than usual: More than half the days  Thoughts that you would be better off dead, or of hurting yourself in some way: Not at all  If you checked off any problems, how difficult have these problems made it for you to do your work, take care of things at home, or get along with other people?: Extremely dIfficult  PHQ-9 Total Score: 19  PHQ-9 Total Score: 19       DIAGNOSIS AND ASSESSMENT DATA     DSM-5 DIAGNOSIS:   1. Major depressive disorder, single episode, moderate ([facility] [address]    2. BETY (generalized anxiety disorder)    3. Chronic post-traumatic stress disorder (PTSD)    4. Insomnia related to another mental disorder       Rule Out Panic Disorder  Rule Out ADHD  Rule Out Bereavement Disorder  Rule Out Obstructive Sleep Apnea    Psychosocial and Contextual Factors[de-identified]  Financial  Occupational  Relationships  Living situation  Educational    PLAN   Follow-up:  Return in about 9 days (around 5/18/2022) for anxiety, depression, medication management, insomnia.    Increase Seroquel XR to 150mg nightly   Patient refusing to take Trazodone, hydroxyzine, or buspar   Make appointment with pulmonology for sleep study  Consider psychotherapy  Exercise 30 minutes 3-4 times per week  Increase fluids, drink at least 8 glasses of water daily, no caffeine after 3pm  Sleep hygiene  Healthy diet  Obtain labs as ordered at prior visit. Prescriptions for this encounter:  New Prescriptions    QUETIAPINE (SEROQUEL XR) 150 MG TB24 EXTENDED RELEASE TABLET    Take 1 tablet by mouth daily       Orders Placed This Encounter   Medications    DISCONTD: QUEtiapine (SEROQUEL) 50 MG tablet     Sig: Take 1 tablet by mouth 2 times daily     Dispense:  60 tablet     Refill:  0    QUEtiapine (SEROQUEL XR) 150 MG TB24 extended release tablet     Sig: Take 1 tablet by mouth daily     Dispense:  30 tablet     Refill:  0       Medications Discontinued During This Encounter   Medication Reason    QUEtiapine (SEROQUEL XR) 50 MG extended release tablet LIST CLEANUP    QUEtiapine (SEROQUEL) 50 MG tablet ERROR       Additional orders:  No orders of the defined types were placed in this encounter. Antipsychotic Medication: We discussed the risks/benefits and side effects of medications. I stressed in particular side effects including but not limited to metabolic syndrome, weight gain, increased prolactin levels, tardive dyskinesia, QTc prolongation, neuroleptic malignant syndrome, excessive somnolence, or confusion. We discussed the effects alcohol and illicit substances have on mood, thought process, physical health and interactions with medications. Discussed the side effects of nicotine and caffeine in sleep disturbance and anxiety. The client and I reviewed several treatment options to address his/her symptoms. I explained the risks/benefits of treatment with and without medication. The patient participated in and indicated understanding of the content of our discussion by agreeing to the mutually developed treatment plan. Risks, potential side effects, possible drug-drug interactions, benefits and alternate treatments discussed in detail. All questions answered.  Patient stated understanding and is agreeable to treatment plan. Patient has been instructed to seek emergency help via the emergency and/or calling 911 should symptoms become severe, worsen, or with other concerning symptoms. Patient instructed to go immediately to the emergency room and/or call 911 with any suicidal or homicidal ideations or if audio/visual hallucinations develop. Patient stated understanding and agrees. Patient given crisis center information. I spent a total of 25 minutes with the patient and over half of that time was spent on counseling and coordination of care regarding topics discussed above. The patient was engaged and responsive throughout session. Utilized CBT, MI and reflective listening to address topics above. The remainder of session spent on symptom evaluation and medication management. TELEPSYCHIATRY VISIT -- Audio/Visual (During OVE-95 public health emergency)     This session was conducted as a telepsychiatry visit due to one or more of the following COVID-19 risk factors being present in this patient:  · The patient is aged 61 or older  · The patient reports chronic health problems  · The patient reports immune suppressed or immune compromised status  · The patient reports being at risk or potentially exposed to the virus     Lester Michele is a 61 y.o. female being evaluated by a Virtual Visit (video visit) encounter to address concerns as mentioned below. Patient identification was verified and a caregiver was present when appropriate. Pursuant to the emergency declaration under the [address] [place] [address], [address] [place] [address] authority and the [employer] General Act, this Virtual Visit was conducted with patient's (and/or legal guardian's) consent, to reduce the patient's risk of exposure to COVID-19 and provide necessary medical care.  The patient (or guardian if applicable) is aware that this is a billable service, which includes applicable co-pays. The patient (and/or legal guardian) has also been advised to contact this office for worsening conditions or problems, and seek emergency medical treatment and/or call 911 if deemed necessary. Services were provided through a synchronous (real-time) audio-video encounter to substitute for in-person clinic visit. The patient was located in a state where the provider was licensed to provide care. Patient and provider were located at their individual homes. --EDD Sanchez CNP on 5/9/2022 at 9:53 PM    An electronic signature was used to authenticate this note.

## 2022-05-10 NOTE — TELEPHONE ENCOUNTER
Please call patient to tell her instead of taking 50mg in the morning and 50mg at night like I told her over the phone, rather she should     -take Seroquel XR 150mg at night (which would be 3 tablets of the 50mg that she has at home). I wrote a prescription, when she picks that up, she will only take one tablet which will be the 150mg. Please let me know if she has any questions.  Thank you,  Suzy Hastings

## 2022-05-18 ENCOUNTER — TELEMEDICINE (OUTPATIENT)
Dept: PSYCHIATRY | Age: 67
End: 2022-05-18

## 2022-05-18 DIAGNOSIS — F43.12 CHRONIC POST-TRAUMATIC STRESS DISORDER (PTSD): ICD-10-CM

## 2022-05-18 DIAGNOSIS — F32.1 MAJOR DEPRESSIVE DISORDER, SINGLE EPISODE, MODERATE (HCC): Primary | ICD-10-CM

## 2022-05-18 DIAGNOSIS — F41.1 GAD (GENERALIZED ANXIETY DISORDER): ICD-10-CM

## 2022-05-18 DIAGNOSIS — F51.05 INSOMNIA RELATED TO ANOTHER MENTAL DISORDER: ICD-10-CM

## 2022-05-18 PROCEDURE — 90833 PSYTX W PT W E/M 30 MIN: CPT

## 2022-05-18 PROCEDURE — 99214 OFFICE O/P EST MOD 30 MIN: CPT

## 2022-05-18 ASSESSMENT — PATIENT HEALTH QUESTIONNAIRE - PHQ9
SUM OF ALL RESPONSES TO PHQ9 QUESTIONS 1 & 2: 6
7. TROUBLE CONCENTRATING ON THINGS, SUCH AS READING THE NEWSPAPER OR WATCHING TELEVISION: 3
9. THOUGHTS THAT YOU WOULD BE BETTER OFF DEAD, OR OF HURTING YOURSELF: 0
2. FEELING DOWN, DEPRESSED OR HOPELESS: 3
8. MOVING OR SPEAKING SO SLOWLY THAT OTHER PEOPLE COULD HAVE NOTICED. OR THE OPPOSITE, BEING SO FIGETY OR RESTLESS THAT YOU HAVE BEEN MOVING AROUND A LOT MORE THAN USUAL: 3
SUM OF ALL RESPONSES TO PHQ QUESTIONS 1-9: 21
1. LITTLE INTEREST OR PLEASURE IN DOING THINGS: 3
4. FEELING TIRED OR HAVING LITTLE ENERGY: 3
5. POOR APPETITE OR OVEREATING: 1
SUM OF ALL RESPONSES TO PHQ QUESTIONS 1-9: 21
6. FEELING BAD ABOUT YOURSELF - OR THAT YOU ARE A FAILURE OR HAVE LET YOURSELF OR YOUR FAMILY DOWN: 3
SUM OF ALL RESPONSES TO PHQ QUESTIONS 1-9: 21
SUM OF ALL RESPONSES TO PHQ QUESTIONS 1-9: 21
10. IF YOU CHECKED OFF ANY PROBLEMS, HOW DIFFICULT HAVE THESE PROBLEMS MADE IT FOR YOU TO DO YOUR WORK, TAKE CARE OF THINGS AT HOME, OR GET ALONG WITH OTHER PEOPLE: 2
3. TROUBLE FALLING OR STAYING ASLEEP: 2

## 2022-05-18 ASSESSMENT — ANXIETY QUESTIONNAIRES
6. BECOMING EASILY ANNOYED OR IRRITABLE: 3
7. FEELING AFRAID AS IF SOMETHING AWFUL MIGHT HAPPEN: 1
1. FEELING NERVOUS, ANXIOUS, OR ON EDGE: 3
5. BEING SO RESTLESS THAT IT IS HARD TO SIT STILL: 3
2. NOT BEING ABLE TO STOP OR CONTROL WORRYING: 3
IF YOU CHECKED OFF ANY PROBLEMS ON THIS QUESTIONNAIRE, HOW DIFFICULT HAVE THESE PROBLEMS MADE IT FOR YOU TO DO YOUR WORK, TAKE CARE OF THINGS AT HOME, OR GET ALONG WITH OTHER PEOPLE: VERY DIFFICULT
GAD7 TOTAL SCORE: 19
3. WORRYING TOO MUCH ABOUT DIFFERENT THINGS: 3
4. TROUBLE RELAXING: 3

## 2022-05-18 ASSESSMENT — COLUMBIA-SUICIDE SEVERITY RATING SCALE - C-SSRS
1. WITHIN THE PAST MONTH, HAVE YOU WISHED YOU WERE DEAD OR WISHED YOU COULD GO TO SLEEP AND NOT WAKE UP?: YES
6. HAVE YOU EVER DONE ANYTHING, STARTED TO DO ANYTHING, OR PREPARED TO DO ANYTHING TO END YOUR LIFE?: NO
2. HAVE YOU ACTUALLY HAD ANY THOUGHTS OF KILLING YOURSELF?: YES
3. HAVE YOU BEEN THINKING ABOUT HOW YOU MIGHT KILL YOURSELF?: YES
4. HAVE YOU HAD THESE THOUGHTS AND HAD SOME INTENTION OF ACTING ON THEM?: NO
5. HAVE YOU STARTED TO WORK OUT OR WORKED OUT THE DETAILS OF HOW TO KILL YOURSELF? DO YOU INTEND TO CARRY OUT THIS PLAN?: NO

## 2022-05-18 NOTE — PROGRESS NOTES
[address] [address]  Dept: [phone]  Dept Fax: [phone]: [phone]    Visit Date: 5/18/2022  Telehealth location  Patient Location: their home  [address] [place] [address] Nurse Practitioner Location: home, [place]  This virtual visit was conducted via interactive, real-time video. SUBJECTIVE DATA       CHIEF COMPLAINT:    Chief Complaint   Patient presents with    Depression    Anxiety    Medication Check    Follow-up    Stress        History obtained from: patient    HISTORY OF PRESENT ILLNESS:    Margarita Temple is a 61 y.o. female who presents to the office for follow up with medication management. HPI  Patient anxious and cooperative. Patient stated she feels the \"same not good at all\", then stated the Seroquel is helping a little with sleep. Patient complains of segs shaking and anxiety. Sleep - difficulty falling asleep, staying asleep, however it is better, sleeping a little longer, but not feeling rested  Interest diminished  Irritability continues  Endorses Hopelessness, helplessness, and worthlessness  Energy and motivation poor  Concentration and memory poor  Appetite good  Denies suicidal or homicidal ideation, plan, or intent  Endorsed visual hallucinations of mother, father, 2 brothers and 1 sister that has been happening for 6 months. Stated she \"forgot\" to tell me at the last two visits. \"I feel like I'm going crazy sometimes\".    Patient rates depression 10/10 and anxiety 10/10 today on a scale of 0-10 with 10 being the worst.    New medical conditions or psychiatric admissions since seen by this provider last? denies  Patient endorses currently taking the following psychiatric medications: seroquel  Adverse reactions from psychotropic medications:  denies  Patient Assets/Support system:  boyfriend  Endorsed coping skills: watch tv, distraction  The patient endorses feeling safe at home Yes  Current Substance Use: denies    Discussed medication management. Patient refuses to take Trazodone for sleep, and refuses to take Buspar for anxiety. Patient willing to try other medications though, stating she needs something for her anxiety and sleep. Patient agreed to increase Seroquel for hallucinations and to help with sleep. Motivational Interviewing and Cognitive Behavioral Therapy utilized, including behavioral modification, insight oriented, and supportive therapy. Patient is encouraged to utilize nonpharmacologic coping skills such as deep breathing, guided imagery, guided meditation, muscle relaxation, calming music, and/or journaling. Records review of communications , labs, and medications from an internal/external source completed. ALLERGIES:    Patient has no known allergies. PAST MEDICAL HISTORY:    Past Medical History:   Diagnosis Date    Anxiety     COPD (chronic obstructive pulmonary disease) ([facility] [address]     Hypertension     Osteoporosis     Thyroid disease        PREVIOUSMEDICATIONS:  Outpatient Medications Prior to Visit   Medication Sig Dispense Refill    QUEtiapine (SEROQUEL XR) 150 MG TB24 extended release tablet Take 1 tablet by mouth daily 30 tablet 0    metoprolol tartrate (LOPRESSOR) 50 MG tablet Take 50 mg by mouth 2 times daily      levothyroxine (SYNTHROID) 75 MCG tablet Take 75 mcg by mouth Daily       No facility-administered medications prior to visit. REVIEW OF SYSTEMS:    Review of Systems   Constitutional: Positive for appetite change and fatigue. Psychiatric/Behavioral: Positive for agitation, decreased concentration, dysphoric mood and sleep disturbance. The patient is nervous/anxious. All other systems reviewed and are negative. The patient sees Bright Liang as her primary care provider.     OBJECTIVE DATA     Wt Readings from Last 3 Encounters:   04/14/22 184 lb 6.4 oz (83.6 kg)   09/12/18 122 lb (55.3 kg)   07/26/18 120 lb (54.4 kg)        Mental Status Exam:   Level of consciousness:  within normal limits  Appearance:  in chair and fair grooming  Within Normal Limits  Behavior/Motor:  psychomotor agitation  Attitude toward examiner:  cooperative, attentive and good eye contact  Speech:  spontaneous, normal rate and normal volume  Mood:  \"not good\" per patient    Anxious, Dysthymic and Sad  Affect:  mood congruent, flat and anxious  Thought processes:  linear, goal directed and coherent  Thought content:  Denies homicidal ideation  Suicidal Ideation:  denies suicidal ideation  Delusions:  no evidence of delusions  Perceptual Disturbance:  denies any perceptual disturbance  Cognition: Patient is oriented to person, place, time and situation  Concentration: poor  Memory: limited  Insight & Judgement: limited   Medication Side Effects: Absent  Attention Span: Attention span and concentration were age appropriate    Screenings Completed in This Encounter:     Anxiety and Depression:      BETY-7 SCREENING 5/18/2022 5/9/2022 4/29/2022   Feeling nervous, anxious, or on edge Nearly every day Nearly every day Nearly every day   Not being able to stop or control worrying Nearly every day Nearly every day Nearly every day   Worrying too much about different things Nearly every day Nearly every day Nearly every day   Trouble relaxing Nearly every day Nearly every day Nearly every day   Being so restless that it is hard to sit still Nearly every day Nearly every day Nearly every day   Becoming easily annoyed or irritable Nearly every day Nearly every day Nearly every day   Feeling afraid as if something awful might happen Several days Nearly every day Nearly every day   BETY-7 Total Score 19 21 21   How difficult have these problems made it for you to do your work, take care of things at home, or get along with other people?  Very difficult Extremely difficult Very difficult           PHQ-2 Over the past 2 weeks, how often have you been bothered by any of the following problems? Little interest or pleasure in doing things: Nearly every day  Feeling down, depressed, or hopeless: Nearly every day  PHQ-2 Score: 6  PHQ-9 Over the past 2 weeks, how often have you been bothered by any of the following problems? Trouble falling or staying asleep, or sleeping too much: More than half the days  Feeling tired or having little energy: Nearly every day  Poor appetite or overeating: Several days  Feeling bad about yourself - or that you are a failure or have let yourself or your family down: Nearly every day  Trouble concentrating on things, such as reading the newspaper or watching television: Nearly every day  Moving or speaking so slowly that other people could have noticed. Or the opposite - being so fidgety or restless that you have been moving around a lot more than usual: Nearly every day  Thoughts that you would be better off dead, or of hurting yourself in some way: Not at all  If you checked off any problems, how difficult have these problems made it for you to do your work, take care of things at home, or get along with other people?: Very difficult  PHQ-9 Total Score: 21  PHQ-9 Total Score: 21       DIAGNOSIS AND ASSESSMENT DATA     DSM-5 DIAGNOSIS:   1. Major depressive disorder, single episode, moderate ([address]    2. Insomnia related to another mental disorder    3. Chronic post-traumatic stress disorder (PTSD)    4. BETY (generalized anxiety disorder)      Rule Out Panic Disorder  Rule Out Bereavement Disorder  Rule Out Obstructive Sleep Apnea    Psychosocial and Contextual Factors[de-identified]  Financial  Occupational  Relationships  Living situation  Educational    PLAN   Follow-up:  Return in about 2 weeks (around 6/1/2022) for anxiety, depression, insomnia, medication management, irritability.   Increase Seroquel to 300mg for irritability/mood, depression  Start Propanolol 60mg daily for anxiety  Consider psychotherapy  Exercise 30 minutes 3-4 times per week  Increase fluids, drink at least 8 glasses of water daily, no caffeine after 3pm  Sleep hygiene  Healthy diet  Obtain labs if not completed at prior visit       Prescriptions for this encounter:  New Prescriptions    PROPRANOLOL (INDERAL LA) 60 MG EXTENDED RELEASE CAPSULE    Take 1 capsule by mouth daily    QUETIAPINE (SEROQUEL) 300 MG TABLET    Take 1 tablet by mouth 2 times daily       Orders Placed This Encounter   Medications    QUEtiapine (SEROQUEL) 300 MG tablet     Sig: Take 1 tablet by mouth 2 times daily     Dispense:  60 tablet     Refill:  0    propranolol (INDERAL LA) 60 MG extended release capsule     Sig: Take 1 capsule by mouth daily     Dispense:  30 capsule     Refill:  0       Medications Discontinued During This Encounter   Medication Reason    QUEtiapine (SEROQUEL XR) 150 MG TB24 extended release tablet LIST CLEANUP       Additional orders:  No orders of the defined types were placed in this encounter. Antipsychotic Medication: We discussed the risks/benefits and side effects of medications. I stressed in particular side effects including but not limited to metabolic syndrome, weight gain, increased prolactin levels, tardive dyskinesia, QTc prolongation, neuroleptic malignant syndrome, excessive somnolence, or confusion. We discussed the effects alcohol and illicit substances have on mood, thought process, physical health and interactions with medications. Discussed the side effects of nicotine and caffeine in sleep disturbance and anxiety. The client and I reviewed several treatment options to address his/her symptoms. I explained the risks/benefits of treatment with and without medication. The patient participated in and indicated understanding of the content of our discussion by agreeing to the mutually developed treatment plan. Risks, potential side effects, possible drug-drug interactions, benefits and alternate treatments discussed in detail.  All questions answered. Patient stated understanding and is agreeable to treatment plan. Patient has been instructed to seek emergency help via the emergency and/or calling 911 should symptoms become severe, worsen, or with other concerning symptoms. Patient instructed to go immediately to the emergency room and/or call 911 with any suicidal or homicidal ideations or if audio/visual hallucinations develop. Patient stated understanding and agrees. Patient given crisis center information. I spent a total of 35 minutes with the patient and over half of that time was spent on counseling and coordination of care regarding topics discussed above. I spent 25 minutes with the patient for psychotherapy. The patient was engaged and responsive throughout session. Utilized CBT, MI and reflective listening to address topics above. The remainder of session spent on symptom evaluation and medication management. TELEPSYCHIATRY VISIT -- Audio/Visual (During GBYDE-72 public health emergency)     This session was conducted as a telepsychiatry visit due to one or more of the following COVID-19 risk factors being present in this patient:  · The patient is aged 61 or older  · The patient reports chronic health problems  · The patient reports immune suppressed or immune compromised status  · The patient reports being at risk or potentially exposed to the virus     Ana Lynn is a 61 y.o. female being evaluated by a Virtual Visit (video visit) encounter to address concerns as mentioned below. Patient identification was verified and a caregiver was present when appropriate.  Pursuant to the emergency declaration under the [facility], [address] [place] [address] waiver authority and the [employer] Act, this Virtual Visit was conducted with patient's (and/or legal guardian's) consent, to reduce the patient's risk of exposure to COVID-19 and provide necessary medical care. The patient (or guardian if applicable) is aware that this is a billable service, which includes applicable co-pays. The patient (and/or legal guardian) has also been advised to contact this office for worsening conditions or problems, and seek emergency medical treatment and/or call 911 if deemed necessary. Services were provided through a synchronous (real-time) audio-video encounter to substitute for in-person clinic visit. The patient was located in a state where the provider was licensed to provide care. Patient and provider were located at their individual homes. --EDD Vásquez CNP on 5/20/2022 at 5:42 PM    An electronic signature was used to authenticate this note.

## 2022-05-20 RX ORDER — QUETIAPINE FUMARATE 300 MG/1
300 TABLET, FILM COATED ORAL 2 TIMES DAILY
Qty: 60 TABLET | Refills: 0 | Status: SHIPPED | OUTPATIENT
Start: 2022-05-20 | End: 2022-07-14 | Stop reason: SDUPTHER

## 2022-05-20 RX ORDER — PROPRANOLOL HCL 60 MG
60 CAPSULE, EXTENDED RELEASE 24HR ORAL DAILY
Qty: 30 CAPSULE | Refills: 0 | Status: SHIPPED | OUTPATIENT
Start: 2022-05-20 | End: 2022-07-22 | Stop reason: ALTCHOICE

## 2022-05-20 NOTE — PATIENT INSTRUCTIONS
Patient Education        Anxiety Disorder: Care Instructions  Your Care Instructions     Anxiety is a normal reaction to stress. Difficult situations can cause you to have symptoms such as sweaty palms and a nervous feeling. In an anxiety disorder, the symptoms are far more severe. Constant worry, muscle tension, trouble sleeping, nausea and diarrhea, and other symptoms can make normal daily activities difficult or impossible. These symptoms may occur for no reason, and they can affect your work, school, or social life. Medicines, counseling, and self-care can all help. Follow-up care is a key part of your treatment and safety. Be sure to make and go to all appointments, and call your doctor if you are having problems. It's also a good idea to know your test results and keep a list of the medicines you take. How can you care for yourself at home? · Take medicines exactly as directed. Call your doctor if you think you are having a problem with your medicine. · Go to your counseling sessions and follow-up appointments. · Recognize and accept your anxiety. Then, when you are in a situation that makes you anxious, say to yourself, \"This is not an emergency. I feel uncomfortable, but I am not in danger. I can keep going even if I feel anxious. \"  · Be kind to your body:  ? Relieve tension with exercise or a massage. ? Get enough rest.  ? Avoid alcohol, caffeine, nicotine, and illegal drugs. They can increase your anxiety level and cause sleep problems. ? Learn and do relaxation techniques. See below for more about these techniques. · Engage your mind. Get out and do something you enjoy. Go to a funny movie, or take a walk or hike. Plan your day. Having too much or too little to do can make you anxious. · Keep a record of your symptoms. Discuss your fears with a good friend or family member, or join a support group for people with similar problems. Talking to others sometimes relieves stress.   · Get involved in social groups, or volunteer to help others. Being alone sometimes makes things seem worse than they are. · Get at least 30 minutes of exercise on most days of the week to relieve stress. Walking is a good choice. You also may want to do other activities, such as running, swimming, cycling, or playing tennis or team sports. Relaxation techniques  Do relaxation exercises 10 to 20 minutes a day. You can play soothing, relaxing music while you do them, if you wish. · Tell others in your house that you are going to do your relaxation exercises. Ask them not to disturb you. · Find a comfortable place, away from all distractions and noise. · Lie down on your back, or sit with your back straight. · Focus on your breathing. Make it slow and steady. · Breathe in through your nose. Breathe out through either your nose or mouth. · Breathe deeply, filling up the area between your navel and your rib cage. Breathe so that your belly goes up and down. · Do not hold your breath. · Breathe like this for 5 to 10 minutes. Notice the feeling of calmness throughout your whole body. As you continue to breathe slowly and deeply, relax by doing the following for another 5 to 10 minutes:  · Tighten and relax each muscle group in your body. You can begin at your toes and work your way up to your head. · Imagine your muscle groups relaxing and becoming heavy. · Empty your mind of all thoughts. · Let yourself relax more and more deeply. · Become aware of the state of calmness that surrounds you. · When your relaxation time is over, you can bring yourself back to alertness by moving your fingers and toes and then your hands and feet and then stretching and moving your entire body. Sometimes people fall asleep during relaxation, but they usually wake up shortly afterward. · Always give yourself time to return to full alertness before you drive a car or do anything that might cause an accident if you are not fully alert.  Never play a relaxation tape while you drive a car. When should you call for help? Call 911 anytime you think you may need emergency care. For example, call if:    · You feel you cannot stop from hurting yourself or someone else. Keep the numbers for these national suicide hotlines: 2-079-869-TALK (0-609-196-199.442.3843) and 8-307-LWXOQRB (3-413.315.3263). If you or someone you know talks about suicide or feeling hopeless, get help right away. Watch closely for changes in your health, and be sure to contact your doctor if:    · You have anxiety or fear that affects your life.     · You have symptoms of anxiety that are new or different from those you had before. Where can you learn more? Go to https://Solegear BioplasticspeAdim8penelopeeb.Morta Security. org and sign in to your Umami account. Enter P754 in the [employer] box to learn more about \"Anxiety Disorder: Care Instructions. \"     If you do not have an account, please click on the \"Sign Up Now\" link. Current as of: September 23, 2020               Content Version: 12.9  © 2006-2021 Pic[employer]f. Care instructions adapted under license by [facility] ([facility]). If you have questions about a medical condition or this instruction, always ask your healthcare professional. [address] any warranty or liability for your use of this information. Patient Education        Recovering From Depression: Care Instructions  Your Care Instructions     Taking good care of yourself is important as you recover from depression. In time, your symptoms will fade as your treatment takes hold. Do not give up. Instead, focus your energy on getting better. Your mood will improve. It just takes some time. Focus on things that can help you feel better, such as being with friends and family, eating well, and getting enough rest. But take things slowly. Do not do too much too soon. Youwill begin to feel better gradually.   Follow-up care is a key part of your treatment and safety. Be sure to make and go to all appointments, and call your doctor if you are having problems. It's also a good idea to know your test results and keep alist of the medicines you take. How can you care for yourself at home? Be realistic   If you have a large task to do, break it up into smaller steps you can handle, and just do what you can.  You may want to put off important decisions until your depression has lifted. If you have plans that will have a major impact on your life, such as marriage, divorce, or a job change, try to wait a bit. Talk it over with friends and loved ones who can help you look at the overall picture first.   Reaching out to people for help is important. Do not isolate yourself. Let your family and friends help you. Find someone you can trust and confide in, and talk to that person.  Be patient, and be kind to yourself. Remember that depression is not your fault and is not something you can overcome with willpower alone. Treatment is important for depression, just like for any other illness. Feeling better takes time, and your mood will improve little by little. Stay active   Stay busy and get outside. Take a walk, or try some other light exercise.  Talk with your doctor about an exercise program. Exercise can help with mild depression.  Go to a movie or concert. Take part in a Restorationist activity or other social gathering. Go to a ball game.  Ask a friend to have dinner with you. Take care of yourself   Eat a balanced diet with plenty of fresh fruits and vegetables, whole grains, and lean protein. If you have lost your appetite, eat small snacks rather than large meals.  Avoid using illegal drugs or marijuana and drinking alcohol. Do not take medicines that have not been prescribed for you. They may interfere with medicines you may be taking for depression, or they may make your depression worse.  Take your medicines exactly as they are prescribed.  You may start to feel better within 1 to 3 weeks of taking antidepressant medicine. But it can take as many as 6 to 8 weeks to see more improvement. If you have questions or concerns about your medicines, or if you do not notice any improvement by 3 weeks, talk to your doctor.  Continue to take your medicine after your symptoms improve. Taking your medicine for at least 6 months after you feel better can help keep you from getting depressed again. If this isn't the first time you have been depressed, your doctor may recommend you to take medicine even longer.  If you have any side effects from your medicine, tell your doctor. Many side effects are mild and will go away on their own after you have been taking the medicine for a few weeks. Some may last longer. Talk to your doctor if side effects are bothering you too much. You might be able to try a different medicine.  Continue counseling. It may help prevent depression from returning, especially if you've had multiple episodes of depression. Talk with your counselor if you are having a hard time attending your sessions or you think the sessions aren't working. Don't just stop going.  Get enough sleep. Talk to your doctor if you are having problems sleeping.  Avoid sleeping pills unless they are prescribed by the doctor treating your depression. Sleeping pills may make you groggy during the day, and they may interact with other medicine you are taking.  If you have any other illnesses, such as diabetes, heart disease, or high blood pressure, make sure to continue with your treatment. Tell your doctor about all of the medicines you take, including those with or without a prescription.  If you or someone you know talks about suicide, self-harm, or feeling hopeless, get help right away. Call the [facility] [address] [place] [employer] at 1-800-273-talk (2-162.669.3958) or text HOME to 871872 to access the Crisis Text Line.  Consider saving these numbers in your phone.  When should you call for help? Call 911 anytime you think you may need emergency care. For example, call if:     You feel like hurting yourself or someone else.      Someone you know has depression and is about to attempt or is attempting suicide. Call your doctor now or seek immediate medical care if:     You hear voices.      Someone you know has depression and:  ? Starts to give away his or her possessions. ? Uses illegal drugs or drinks alcohol heavily. ? Talks or writes about death, including writing suicide notes or talking about guns, knives, or pills. ? Starts to spend a lot of time alone. ? Acts very aggressively or suddenly appears calm. Watch closely for changes in your health, and be sure to contact your doctor if:     You do not get better as expected. Where can you learn more? Go to https://chcarrilloeb.ISIS. org and sign in to your BidModo account. Enter X697 in the COINPLUS box to learn more about \"Recovering From Depression: Care Instructions. \"     If you do not have an account, please click on the \"Sign Up Now\" link. Current as of: June 16, 2021               Content Version: 13.2  © 4291-3611 BinOptics. Care instructions adapted under license by [facility] ([facility]). If you have questions about a medical condition or this instruction, always ask your healthcare professional. [place] [address] any warranty or liability for your use of this information. Patient Education        Insomnia: Care Instructions  Overview     Insomnia is the inability to sleep well. Insomnia may make it hard for you to get to sleep, stay asleep, or sleep as long as you need to. This can make you tired and grouchy during the day. It can also make you forgetful, lesseffective at work, and unhappy. Insomnia can be linked to many things. These include health problems,medicines, and stressful events.   Treatment may include treating problems that may be linked with your insomnia. Treatment also includes behavior and lifestyle changes. This may include cognitive-behavioral therapy for insomnia (CBT-I). CBT-I uses different ways to help you change your thoughts and behaviors that may interfere with sleep. Your doctor can recommend specific things you can try. Examples include doing relaxation exercises, keeping regular bedtimes and wake times, limiting alcohol, and making healthy sleep habits. Some people decide to take medicinefor a while to help with sleep. Follow-up care is a key part of your treatment and safety. Be sure to make and go to all appointments, and call your doctor if you are having problems. It's also a good idea to know your test results and keep alist of the medicines you take. How can you care for yourself at home? Cognitive-behavioral therapy for insomnia (CBT-I)   If your doctor recommends CBT-I, follow your treatment plan. Your doctor will give you instructions that are unique for you.  Your plan will likely include a few things that you can try at home. For example:  ? Try meditation or other relaxation techniques before you go to bed. ? Go to bed at the same time every night, and wake up at the same time every morning. Do not take naps during the day. ? Do not stay in bed awake for too long. If you can't fall asleep, or if you wake up in the middle of the night and can't get back to sleep within about 15 to 20 minutes, get out of bed and go to another room until you feel sleepy. ? If watching the clock makes you anxious, turn it facing away from you so you cannot see the time. ? If you worry when you lie down, start a worry book. Well before bedtime, write down your worries, and then set the book and your concerns aside. Healthy sleep habits   If your doctor recommends it, try making healthy sleep habits. For example:  ? Keep your bedroom quiet, dark, and cool.   ? Do not have drinks with caffeine, such as coffee or black tea, for 8 hours before bed. ? Do not smoke or use other types of tobacco near bedtime. Nicotine is a stimulant and can keep you awake. ? Avoid drinking alcohol late in the evening, because it can cause you to wake in the middle of the night. ? Do not eat a big meal close to bedtime. If you are hungry, eat a light snack. ? Do not drink a lot of water close to bedtime, because the need to urinate may wake you up during the night. ? Do not read, watch TV, or use your phone in bed. Use the bed only for sleeping and sex. Medicine   Be safe with medicines. Take your medicines exactly as prescribed. Call your doctor if you think you are having a problem with your medicine.  Talk with your doctor before you try an over-the-counter medicine, herbal product, or supplement to try to improve your sleep. Your doctor can recommend how much to take and when to take it. Make sure your doctor knows all of the medicines, vitamins, herbal products, and supplements you take.  You will get more details on the specific medicines your doctor prescribes. When should you call for help? Watch closely for changes in your health, and be sure to contact your doctor if:     Your efforts to improve your sleep do not work.      Your insomnia gets worse.      You have been feeling down, depressed, or hopeless or have lost interest in things that you usually enjoy. Where can you learn more? Go to https://Allied Pacific Sports NetworkpealfredFippex.Be At One. org and sign in to your TranslateMedia account. Enter P513 in the Perzo box to learn more about \"Insomnia: Care Instructions. \"     If you do not have an account, please click on the \"Sign Up Now\" link. Current as of: June 16, 2021               Content Version: 13.2  © 9031-9062 Healthwise, Incorporated. Care instructions adapted under license by [facility] ([facility]).  If you have questions about a medical condition or this instruction, always ask your healthcare professional. Pema Davis, Incorporated disclaims any warranty or liability for your use of this information. Patient Education        Learning About Sleeping Well  What does sleeping well mean? Sleeping well means getting enough sleep to feel good and stay healthy. Howmuch sleep is enough varies among people. The number of hours you sleep and how you feel when you wake up are both important. If you do not feel refreshed, you probably need more sleep. Anothersign of not getting enough sleep is feeling tired during the day. Experts recommend that adults get at least 7 or more hours of sleep per day. Children and older adults need more sleep. Why is getting enough sleep important? Getting enough quality sleep is a basic part of good health. When your sleep suffers, your physical health, mood, and your thoughts can suffer too. You may find yourself feeling more grumpy or stressed. Not getting enough sleep also can lead to serious problems, including injury, accidents, anxiety, anddepression. What might cause poor sleeping? Many things can cause sleep problems, including:   Changes to your sleep schedule.  Stress. Stress can be caused by fear about a single event, such as giving a speech. Or you may have ongoing stress, such as worry about work or school.  Depression, anxiety, and other mental or emotional conditions.  Changes in your sleep habits or surroundings. This includes changes that happen where you sleep, such as noise, light, or sleeping in a different bed. It also includes changes in your sleep pattern, such as having jet lag or working a late shift.  Health problems, such as pain, breathing problems, and restless legs syndrome.  Lack of regular exercise.  Using alcohol, nicotine, or caffeine before bed. How can you help yourself? Here are some tips that may help you sleep more soundly and wake up feelingmore refreshed. Your sleeping area    Use your bedroom only for sleeping and sex.  A bit of light reading may help you fall asleep. But if it doesn't, do your reading elsewhere in the house. Try not to use your TV, computer, smartphone, or tablet while you are in bed.  Be sure your bed is big enough to stretch out comfortably, especially if you have a sleep partner.  Keep your bedroom quiet, dark, and cool. Use curtains, blinds, or a sleep mask to block out light. To block out noise, use earplugs, soothing music, or a \"white noise\" machine. Your evening and bedtime routine    Create a relaxing bedtime routine. You might want to take a warm shower or bath, or listen to soothing music.  Go to bed at the same time every night. And get up at the same time every morning, even if you feel tired. What to avoid    Limit caffeine (coffee, tea, caffeinated sodas) during the day, and don't have any for at least 6 hours before bedtime.  Avoid drinking alcohol before bedtime. Alcohol can cause you to wake up more often during the night.  Try not to smoke or use tobacco, especially in the evening. Nicotine can keep you awake.  Limit naps during the day, especially close to bedtime.  Avoid lying in bed awake for too long. If you can't fall asleep or if you wake up in the middle of the night and can't get back to sleep within about 20 minutes, get out of bed and go to another room until you feel sleepy.  Avoid taking medicine right before bed that may keep you awake or make you feel hyper or energized. Your doctor can tell you if your medicine may do this and if you can take it earlier in the day. If you can't sleep    Imagine yourself in a peaceful, pleasant scene. Focus on the details and feelings of being in a place that is relaxing.  Get up and do a quiet or boring activity until you feel sleepy.  Avoid drinking any liquids before going to bed to help prevent waking up often to use the bathroom. Where can you learn more? Go to https://heladio.healthNational Fuel Solutions. org and sign in to your MyChart account. Enter L468 in the [facility] box to learn more about \"Learning About Sleeping Well. \"     If you do not have an account, please click on the \"Sign Up Now\" link. Current as of: June 16, 2021               Content Version: 13.2  © 2626-9684 Healthwise, Incorporated. Care instructions adapted under license by [facility] ([facility]). If you have questions about a medical condition or this instruction, always ask your healthcare professional. [place] [address] any warranty or liability for your use of this information. Patient Education        Suicidal Thoughts and Behavior: Care Instructions  Overview  You have been seen by a doctor because you've had thoughts of suicide or have harmed yourself. Your doctor and support team want to help keep you safe. Yourteam may include a , a , and a counselor. People often think about suicide because they feel hopeless, helpless, or worthless. These feelings may come from having a mental health problem, such asdepression. These problems can be treated. It's important to remember that there are people who care about you. Your doctor and support team take your pain very seriously, and they want to help. Treatment and close follow-up care can help you feel better. Follow-up care is a key part of your treatment and safety. Be sure to make and go to all appointments, and call your doctor if you are having problems. It's also a good idea to know your test results and keep alist of the medicines you take. How can you care for yourself at home?  Talk to someone. Be open about your feelings. Reach out to a trusted family member or friend, your doctor, or a counselor. You can also call the [address] [place] [address] at 3-007-617-HFBR (1-108.888.5640). Or text HOME to 030855 to access the Crisis Text Line. Consider saving these numbers in your phone.    Attend all counseling sessions recommended by your doctor.  Make a suicide safety plan. This is a set of steps you can take when you feel suicidal. It includes your warning signs, coping strategies, and people you can ask for support. It's best to work with a therapist to make your plan.  Ask someone to remove and store any guns, pills, or other means of suicide.  Avoid alcohol and drug use.  Be safe with medicines. Take your medicines exactly as prescribed. Call your doctor if you think you are having a problem with your medicine. When should you call for help? Call 911 anytime you think you may need emergency care. For example, call if:     You feel you cannot stop from hurting yourself or someone else. Call your doctor now or seek immediate medical care if:     You have one or more warning signs of suicide. For example, call if:  ? You feel like giving away your possessions. ? You use illegal drugs or drink alcohol heavily. ? You talk or write about death. This may include writing suicide notes and talking about guns, knives, or pills. ? You start to spend a lot of time alone or spend more time alone than usual.      You hear voices.      You start acting in an aggressive way that's not normal for you. Watch closely for changes in your health, and be sure to contact your doctor ifyou have any problems. Where can you learn more? Go to https://ClearPoint Learning Systems.Infoflow. org and sign in to your Euclid Media account. Enter A237 in the [facility] box to learn more about \"Suicidal Thoughts and Behavior: Care Instructions. \"     If you do not have an account, please click on the \"Sign Up Now\" link. Current as of: June 16, 2021               Content Version: 13.2  © 8222-0590 Healthwise, Incorporated. Care instructions adapted under license by [facility] ([facility]).  If you have questions about a medical condition or this instruction, always ask your healthcare professional. [address] any warranty or liability for your use of this information. Patient Education        Learning About Making a Suicide Safety Plan  Overview     A safety plan is a set of steps you can take when you feel suicidal. It includes your warning signs, coping strategies, and people to ask for support. You can write your own safety plan or use a free phone ed. But it's best towork with a therapist to make your plan. How can you make and use your plan? If you feel like you can't keep from hurting yourself or someone else, get help right away. Call WrapMail1 or the National Suicide Prevention Lifeline: 3-249-841-XINB ([phone]). Or text HOME to [phone] to access the Crisis Text Line. Having a safety plan is important for anyone who thinks about suicide. It can help you (or someone you care about) get safely through times of crisis. If possible, try to make your plan during a time when you aren't in a crisis soit's ready when you need it. To use the plan, move through it step-by-step. So first, check your warning signs. Then try your own coping strategies. If those don't help, move through the rest of the steps until you have the help you need to get through thecrisis. Here's how to make a safety plan. 1. Make a list of your crisis warning signs. What happens when you start to think about suicide? Make a list of your warningsigns--the things you think, feel, or do when you start to feel suicidal.  2. List your personal coping strategies. What can you do or think about to avoid acting when you feel suicidal? This may include your reason(s) to live. Rank these ideas by how well you think they'll work. What might keep you from using them? What might make you more likely touse them? 3. Come up with some sources of support and distraction. Think of people and places that could help shift your attention away from painful feelings or thoughts of dying.  This may include children you care aboutor a safe social space, like a coffee shop or a bookstore. 4. Make a list of people you can count on for help. Think about who you could contact in a crisis. Who do you trust and confide in? Who is always there when you need them? This might be a friend, a family member, or someone else, like a caregiver or . If no one comes to mind, that's okay. Be sure you have some professional support, such as a doctor orcounselor. 5. List your professional sources of support. This may include your doctor or therapist, local emergency rooms, and local crisis hotlines. Also include the National Suicide Prevention Lifeline: Call 2-715.603.4917 or text HOME to 227683. Be sure to save these numbers in yourphone. 6. Think through ways to keep yourself safe. Do you have weapons or other means to hurt yourself? Consider how to limit your access to them. For example, if you have a gun, maybe you could ask a friend orfamily member to lock it up for you. When should you call for help? Call 911 anytime you think you may need emergency care. For example, call if:     You feel you cannot stop from hurting yourself or someone else. Call your doctor now or seek immediate medical care if:     You have one or more warning signs of suicide. For example, call if:  ? You feel like giving away your possessions. ? You use illegal drugs or drink alcohol heavily. ? You talk or write about death. This may include writing suicide notes and talking about guns, knives, or pills. ? You start to spend a lot of time alone or spend more time alone than usual.      You hear voices.      You start acting in an aggressive way that's not normal for you. Watch closely for changes in your health, and be sure to contact your doctor ifyou have any problems. Where can you learn more? Go to https://chcarrilloeb.[employer]. org and sign in to your [employer] account. Enter l123 in the SecureWorks box to learn more about \"Learning About Making a Suicide Safety Plan. \"     If you do not have an account, please click on the \"Sign Up Now\" link. Current as of: June 17, 2021               Content Version: 13.2  © 7983-5683 Healthwise, Incorporated. Care instructions adapted under license by [facility] ([facility]). If you have questions about a medical condition or this instruction, always ask your healthcare professional. [address] any warranty or liability for your use of this information.

## 2022-05-23 ENCOUNTER — TELEPHONE (OUTPATIENT)
Dept: PSYCHIATRY | Age: 67
End: 2022-05-23

## 2022-05-23 NOTE — TELEPHONE ENCOUNTER
Staff received the following message from the provider:    Please notify patient that I added Propanolol prescription for anxiety and insomnia. This can cause side effects of: dizziness and fatigue, low heart rate and hypotension. --Attempted to reach the patient but her VM did not indicate it was her; so I will try again later to reach the patient directly or await call back.

## 2022-05-23 NOTE — TELEPHONE ENCOUNTER
Arturo Whitaker called back into the office; she was informed of this information and understood. She said thank you.

## 2022-06-03 ENCOUNTER — TELEMEDICINE (OUTPATIENT)
Dept: PSYCHIATRY | Age: 67
End: 2022-06-03

## 2022-06-03 DIAGNOSIS — F32.1 MAJOR DEPRESSIVE DISORDER, SINGLE EPISODE, MODERATE (HCC): Primary | ICD-10-CM

## 2022-06-03 DIAGNOSIS — F51.05 INSOMNIA RELATED TO ANOTHER MENTAL DISORDER: ICD-10-CM

## 2022-06-03 DIAGNOSIS — F43.12 CHRONIC POST-TRAUMATIC STRESS DISORDER (PTSD): ICD-10-CM

## 2022-06-03 DIAGNOSIS — F41.1 GAD (GENERALIZED ANXIETY DISORDER): ICD-10-CM

## 2022-06-03 PROCEDURE — 90833 PSYTX W PT W E/M 30 MIN: CPT

## 2022-06-03 PROCEDURE — 99214 OFFICE O/P EST MOD 30 MIN: CPT

## 2022-06-03 RX ORDER — ESCITALOPRAM OXALATE 10 MG/1
10 TABLET ORAL DAILY
Qty: 30 TABLET | Refills: 0 | Status: SHIPPED | OUTPATIENT
Start: 2022-06-03 | End: 2022-07-22

## 2022-06-03 ASSESSMENT — PATIENT HEALTH QUESTIONNAIRE - PHQ9
SUM OF ALL RESPONSES TO PHQ QUESTIONS 1-9: 20
7. TROUBLE CONCENTRATING ON THINGS, SUCH AS READING THE NEWSPAPER OR WATCHING TELEVISION: 1
SUM OF ALL RESPONSES TO PHQ9 QUESTIONS 1 & 2: 6
3. TROUBLE FALLING OR STAYING ASLEEP: 3
8. MOVING OR SPEAKING SO SLOWLY THAT OTHER PEOPLE COULD HAVE NOTICED. OR THE OPPOSITE, BEING SO FIGETY OR RESTLESS THAT YOU HAVE BEEN MOVING AROUND A LOT MORE THAN USUAL: 3
10. IF YOU CHECKED OFF ANY PROBLEMS, HOW DIFFICULT HAVE THESE PROBLEMS MADE IT FOR YOU TO DO YOUR WORK, TAKE CARE OF THINGS AT HOME, OR GET ALONG WITH OTHER PEOPLE: 2
2. FEELING DOWN, DEPRESSED OR HOPELESS: 3
SUM OF ALL RESPONSES TO PHQ QUESTIONS 1-9: 20
4. FEELING TIRED OR HAVING LITTLE ENERGY: 3
SUM OF ALL RESPONSES TO PHQ QUESTIONS 1-9: 20
SUM OF ALL RESPONSES TO PHQ QUESTIONS 1-9: 20
6. FEELING BAD ABOUT YOURSELF - OR THAT YOU ARE A FAILURE OR HAVE LET YOURSELF OR YOUR FAMILY DOWN: 3
9. THOUGHTS THAT YOU WOULD BE BETTER OFF DEAD, OR OF HURTING YOURSELF: 0
1. LITTLE INTEREST OR PLEASURE IN DOING THINGS: 3
5. POOR APPETITE OR OVEREATING: 1

## 2022-06-03 ASSESSMENT — COLUMBIA-SUICIDE SEVERITY RATING SCALE - C-SSRS
4. HAVE YOU HAD THESE THOUGHTS AND HAD SOME INTENTION OF ACTING ON THEM?: NO
2. HAVE YOU ACTUALLY HAD ANY THOUGHTS OF KILLING YOURSELF?: NO
3. HAVE YOU BEEN THINKING ABOUT HOW YOU MIGHT KILL YOURSELF?: NO
6. HAVE YOU EVER DONE ANYTHING, STARTED TO DO ANYTHING, OR PREPARED TO DO ANYTHING TO END YOUR LIFE?: NO
1. WITHIN THE PAST MONTH, HAVE YOU WISHED YOU WERE DEAD OR WISHED YOU COULD GO TO SLEEP AND NOT WAKE UP?: NO
5. HAVE YOU STARTED TO WORK OUT OR WORKED OUT THE DETAILS OF HOW TO KILL YOURSELF? DO YOU INTEND TO CARRY OUT THIS PLAN?: NO

## 2022-06-03 ASSESSMENT — ANXIETY QUESTIONNAIRES
4. TROUBLE RELAXING: 3
2. NOT BEING ABLE TO STOP OR CONTROL WORRYING: 2
1. FEELING NERVOUS, ANXIOUS, OR ON EDGE: 2
3. WORRYING TOO MUCH ABOUT DIFFERENT THINGS: 2
GAD7 TOTAL SCORE: 13
5. BEING SO RESTLESS THAT IT IS HARD TO SIT STILL: 1
6. BECOMING EASILY ANNOYED OR IRRITABLE: 3
7. FEELING AFRAID AS IF SOMETHING AWFUL MIGHT HAPPEN: 0
IF YOU CHECKED OFF ANY PROBLEMS ON THIS QUESTIONNAIRE, HOW DIFFICULT HAVE THESE PROBLEMS MADE IT FOR YOU TO DO YOUR WORK, TAKE CARE OF THINGS AT HOME, OR GET ALONG WITH OTHER PEOPLE: EXTREMELY DIFFICULT

## 2022-06-03 NOTE — PROGRESS NOTES
[address] [facility] [id number]  Dept: [phone]  Dept Fax: [phone]  Loc: [phone]    Visit Date: 6/3/2022    SUBJECTIVE DATA       CHIEF COMPLAINT:    Chief Complaint   Patient presents with    Medication Check    Follow-up    Anxiety    Depression        History obtained from: patient    HISTORY OF PRESENT ILLNESS:    Jen Lamas is a 61 y.o. female who presents to the office for follow up with medication management. HPI  Patient presents calm and cooperative. Patient states feeling \"irritable\" today. Haven't started the propranolol as its too expensive, stated over $100 as she is self pay and no insurance. She stated her anxiety is bothering her. Patient stated klonopin is the only medication that works for her, and is fixated on getting the Klonipin at each visit. However provider explained there are other options for her anxiety that are not addictive. Discussed medication management with patient, explained adding an antidepressant with the seroquel will help with depression and anxiety. Patient stated none of the antidepressants work. Explained it can take 6-8 weeks for medications to work fully. Patient stated \"I don't have time for that\". Provider responded, 6-8 weeks to feel better is better than never feeling better. Patient verbalized understanding and finally agreed to try it. Notified patient to have the propranolol Rx transferred from [address] [facility] to [address] [facility] and go through [employer], which would cost only $6 Or at [facility] it is $19. Encouraged patient to start the prescriptions to help with her anxiety and depression. Patient verbalized understanding. Patient declined Prazosin to help with nightmares.      Patient rates depression 10/10 and anxiety 10/10 today on a scale of 0-10 with 10 being the worst.  Sleep- not able to fall asleep, or stay asleep, not feeling rested  Interest- diminished  Energy and motivation - diminished  Concentration - diminished  Memory -poor  Appetite- poor  endorses irritability  endorses restlessness  Endorses nightmares  Endorses hopelessness, helplessness, and worthlessness  Denies suicidal or homicidal ideation, plan, or intent  Endorses visual hallucinations, which she stated have always been present. Denies auditory hallucinations. New medical conditions or psychiatric admissions since seen by this provider last? Denies  Patient endorses currently taking the following psychiatric medications: Seroquel 300mg BID, Patient stated she is not taking the Propranolol ER 60mg  Adverse reactions from psychotropic medications:  Denies  Past psychiatric medications include: Amitryptaline, Xanax, Ambien, Zoloft, Hydroxyzine \"didn't work\", Seroquel-(which she stated worked for her), Trazodone \"didn't work\", other unknown medications. Patient states she cannot take Buspar as it made hallucinations worse. Patient Assets/Support system:   boyfriend  Endorsed coping skills: distraction, watch tv  The patient endorses feeling safe at home Yes  Current Substance Use: Denies    Unable to perform a full AIMS assessment related to limitations of tele-technology, but no involuntary or abnormal movements or other symptoms noted or reported during the tele assessment today. Motivational Interviewing and Cognitive Behavioral Therapy utilized, including behavioral modification, insight oriented, and supportive therapy. Patient is encouraged to utilize nonpharmacologic coping skills such as deep breathing, guided imagery, guided meditation, muscle relaxation, calming music, and/or journaling. Records review of communications , labs, and medications from an internal/external source completed. ALLERGIES:    Patient has no known allergies.      PAST MEDICAL HISTORY:    Past Medical History:   Diagnosis Date    Anxiety     [facility]     Hypertension     Osteoporosis     Thyroid disease        PREVIOUSMEDICATIONS:  Outpatient Medications Prior to Visit   Medication Sig Dispense Refill    QUEtiapine (SEROQUEL) 300 MG tablet Take 1 tablet by mouth 2 times daily 60 tablet 0    propranolol (INDERAL LA) 60 MG extended release capsule Take 1 capsule by mouth daily 30 capsule 0    metoprolol tartrate (LOPRESSOR) 50 MG tablet Take 50 mg by mouth 2 times daily      levothyroxine (SYNTHROID) 75 MCG tablet Take 75 mcg by mouth Daily       No facility-administered medications prior to visit. REVIEW OF SYSTEMS:    Review of Systems   Constitutional: Positive for fatigue. Psychiatric/Behavioral: Positive for agitation, decreased concentration, dysphoric mood, hallucinations and sleep disturbance. The patient is nervous/anxious. All other systems reviewed and are negative. The patient sees Fred Mckeon as her primary care provider.     OBJECTIVE DATA     Wt Readings from Last 3 Encounters:   04/14/22 184 lb 6.4 oz (83.6 kg)   09/12/18 122 lb (55.3 kg)   07/26/18 120 lb (54.4 kg)        Mental Status Exam:   Level of consciousness:  within normal limits  Appearance:  in chair and fair grooming   Behavior/Motor:  no abnormalities noted  Attitude toward examiner:  cooperative, attentive and good eye contact  Speech:  spontaneous, normal rate and normal volume  Mood:  \"irritable\" per patient   Dysthymic and Sad  Affect:  mood congruent and flat  Thought processes:  linear, goal directed and coherent  Thought content:  Denies homicidal ideation  Suicidal Ideation:  denies suicidal ideation  Delusions:  no evidence of delusions  Perceptual Disturbance:  visual  Cognition: Patient is oriented to person, place, time and situation  Concentration: poor  Memory: limited  Insight & Judgement: limited   Medication Side Effects: Absent  Attention Span: Attention span and concentration were age appropriate    Screenings Completed in This Encounter:     Anxiety and Depression:      BETY-7 SCREENING 6/3/2022 5/18/2022 5/9/2022   Feeling nervous, anxious, or on edge More than half the days Nearly every day Nearly every day   Not being able to stop or control worrying More than half the days Nearly every day Nearly every day   Worrying too much about different things More than half the days Nearly every day Nearly every day   Trouble relaxing Nearly every day Nearly every day Nearly every day   Being so restless that it is hard to sit still Several days Nearly every day Nearly every day   Becoming easily annoyed or irritable Nearly every day Nearly every day Nearly every day   Feeling afraid as if something awful might happen Not at all Several days Nearly every day   BETY-7 Total Score 13 19 21   How difficult have these problems made it for you to do your work, take care of things at home, or get along with other people? Extremely difficult Very difficult Extremely difficult           PHQ-2 Over the past 2 weeks, how often have you been bothered by any of the following problems? Little interest or pleasure in doing things: Nearly every day  Feeling down, depressed, or hopeless: Nearly every day  PHQ-2 Score: 6  PHQ-9 Over the past 2 weeks, how often have you been bothered by any of the following problems? Trouble falling or staying asleep, or sleeping too much: Nearly every day  Feeling tired or having little energy: Nearly every day  Poor appetite or overeating: Several days  Feeling bad about yourself - or that you are a failure or have let yourself or your family down: Nearly every day  Trouble concentrating on things, such as reading the newspaper or watching television: Several days  Moving or speaking so slowly that other people could have noticed.  Or the opposite - being so fidgety or restless that you have been moving around a lot more than usual: Nearly every day  Thoughts that you would be better off dead, or of hurting yourself in some way: Not at all  If you checked off any problems, how difficult have these problems made it for you to do your work, take care of things at home, or get along with other people?: Very difficult  PHQ-9 Total Score: 20  PHQ-9 Total Score: 20       DIAGNOSIS AND ASSESSMENT DATA     DSM-5 DIAGNOSIS:   1. Major depressive disorder, single episode, moderate ([facility]    2. Chronic post-traumatic stress disorder (PTSD)    3. BETY (generalized anxiety disorder)    4. Insomnia related to another mental disorder      Rule Out Panic Disorder  Rule Out Bereavement Disorder  Rule Out Obstructive Sleep Apnea     Psychosocial and Contextual Factors[de-identified]  Financial  Occupational  Relationships  Legal  Living situation  Educational    PLAN   Follow-up:  Return in about 2 weeks (around 6/17/2022) for depression, anxiety, medication management. Continue Seroquel 300mg for irritability/mood, depression  Start Propanolol 60mg daily for anxiety  Start Lexapro 10mg in morning for anxiety and depression  Consider psychotherapy   Exercise 30 minutes 3-4 times per week  Increase fluids, drink at least 8 glasses of water daily, no caffeine after 3pm  Sleep hygiene  Healthy diet    Prescriptions for this encounter:  New Prescriptions    ESCITALOPRAM (LEXAPRO) 10 MG TABLET    Take 1 tablet by mouth daily       Orders Placed This Encounter   Medications    escitalopram (LEXAPRO) 10 MG tablet     Sig: Take 1 tablet by mouth daily     Dispense:  30 tablet     Refill:  0       There are no discontinued medications. Additional orders:  No orders of the defined types were placed in this encounter. Antipsychotic Medication: We discussed the risks/benefits and side effects of medications. I stressed in particular side effects including but not limited to metabolic syndrome, weight gain, increased prolactin levels, tardive dyskinesia, QTc prolongation, neuroleptic malignant syndrome, excessive somnolence, or confusion. Antidepressant Medications:  We discussed the risks/benefits and side effects of medications. I stressed in particular side effects including but not limited to gastrointestinal problems, sexual dysfunction, serotonin syndrome, agitation, rare induction of jax, rare activation of suicidality. We discussed the effects alcohol and illicit substances have on mood, thought process, physical health and interactions with medications. Discussed the side effects of nicotine and caffeine in sleep disturbance and anxiety. The client and I reviewed several treatment options to address his/her symptoms. I explained the risks/benefits of treatment with and without medication. The patient participated in and indicated understanding of the content of our discussion by agreeing to the mutually developed treatment plan. Risks, potential side effects, possible drug-drug interactions, benefits and alternate treatments discussed in detail. All questions answered. Patient stated understanding and is agreeable to treatment plan. Patient has been instructed to seek emergency help via the emergency and/or calling 911 should symptoms become severe, worsen, or with other concerning symptoms. Patient instructed to go immediately to the emergency room and/or call 911 with any suicidal or homicidal ideations or if audio/visual hallucinations develop. Patient stated understanding and agrees. Patient given crisis center information. I spent a total of 30 minutes with the patient and over half of that time was spent on counseling and coordination of care regarding topics discussed above. I spent 20 minutes with the patient for psychotherapy. The patient was engaged and responsive throughout session. Utilized CBT, MI and reflective listening to address topics above. The remainder of session spent on symptom evaluation and medication management.     Provider Signature:  Electronically signed by EDD Ryan CNP on 6/3/2022 at 12:30 PM    **This report has been created using voice recognition software. It may contain minor errors which are inherent in voice recognition technology. **     Kaylin Estrada, was evaluated through a synchronous (real-time) audio-video encounter. The patient (or guardian if applicable) is aware that this is a billable service, which includes applicable co-pays. This Virtual Visit was conducted with patient's (and/or legal guardian's) consent. The visit was conducted pursuant to the emergency declaration under the [address] [place] [address], [address] [place] [address] authority and the "[employer] and [employer] General Act. Patient identification was verified, and a caregiver was present when appropriate. The patient was located at Home: [place] [address]. Provider was located at Home ([address]: [place]. Total time spent for this encounter: 30    --EDD Baeza CNP on 6/3/2022 at 12:30 PM    An electronic signature was used to authenticate this note.

## 2022-06-03 NOTE — PATIENT INSTRUCTIONS
Patient Education        Anxiety Disorder: Care Instructions  Your Care Instructions     Anxiety is a normal reaction to stress. Difficult situations can cause you to have symptoms such as sweaty palms and a nervous feeling. In an anxiety disorder, the symptoms are far more severe. Constant worry, muscle tension, trouble sleeping, nausea and diarrhea, and other symptoms can make normal daily activities difficult or impossible. These symptoms may occur for no reason, and they can affect your work, school, or social life. Medicines, counseling, and self-care can all help. Follow-up care is a key part of your treatment and safety. Be sure to make and go to all appointments, and call your doctor if you are having problems. It's also a good idea to know your test results and keep a list of the medicines you take. How can you care for yourself at home? · Take medicines exactly as directed. Call your doctor if you think you are having a problem with your medicine. · Go to your counseling sessions and follow-up appointments. · Recognize and accept your anxiety. Then, when you are in a situation that makes you anxious, say to yourself, \"This is not an emergency. I feel uncomfortable, but I am not in danger. I can keep going even if I feel anxious. \"  · Be kind to your body:  ? Relieve tension with exercise or a massage. ? Get enough rest.  ? Avoid alcohol, caffeine, nicotine, and illegal drugs. They can increase your anxiety level and cause sleep problems. ? Learn and do relaxation techniques. See below for more about these techniques. · Engage your mind. Get out and do something you enjoy. Go to a funny movie, or take a walk or hike. Plan your day. Having too much or too little to do can make you anxious. · Keep a record of your symptoms. Discuss your fears with a good friend or family member, or join a support group for people with similar problems. Talking to others sometimes relieves stress.   · Get involved in social groups, or volunteer to help others. Being alone sometimes makes things seem worse than they are. · Get at least 30 minutes of exercise on most days of the week to relieve stress. Walking is a good choice. You also may want to do other activities, such as running, swimming, cycling, or playing tennis or team sports. Relaxation techniques  Do relaxation exercises 10 to 20 minutes a day. You can play soothing, relaxing music while you do them, if you wish. · Tell others in your house that you are going to do your relaxation exercises. Ask them not to disturb you. · Find a comfortable place, away from all distractions and noise. · Lie down on your back, or sit with your back straight. · Focus on your breathing. Make it slow and steady. · Breathe in through your nose. Breathe out through either your nose or mouth. · Breathe deeply, filling up the area between your navel and your rib cage. Breathe so that your belly goes up and down. · Do not hold your breath. · Breathe like this for 5 to 10 minutes. Notice the feeling of calmness throughout your whole body. As you continue to breathe slowly and deeply, relax by doing the following for another 5 to 10 minutes:  · Tighten and relax each muscle group in your body. You can begin at your toes and work your way up to your head. · Imagine your muscle groups relaxing and becoming heavy. · Empty your mind of all thoughts. · Let yourself relax more and more deeply. · Become aware of the state of calmness that surrounds you. · When your relaxation time is over, you can bring yourself back to alertness by moving your fingers and toes and then your hands and feet and then stretching and moving your entire body. Sometimes people fall asleep during relaxation, but they usually wake up shortly afterward. · Always give yourself time to return to full alertness before you drive a car or do anything that might cause an accident if you are not fully alert.  Never play a relaxation tape while you drive a car. When should you call for help? Call 911 anytime you think you may need emergency care. For example, call if:    · You feel you cannot stop from hurting yourself or someone else. Keep the numbers for these national suicide hotlines: 7-450-771-TALK (3-774.903.8762) and 6-997-GGAZIZF (6-723.835.5885). If you or someone you know talks about suicide or feeling hopeless, get help right away. Watch closely for changes in your health, and be sure to contact your doctor if:    · You have anxiety or fear that affects your life.     · You have symptoms of anxiety that are new or different from those you had before. Where can you learn more? Go to https://[email or web address].[email or web address] org and sign in to your kaleo account. Enter P754 in the [employer] box to learn more about \"Anxiety Disorder: Care Instructions. \"     If you do not have an account, please click on the \"Sign Up Now\" link. Current as of: September 23, 2020               Content Version: 12.9  © 2006-2021 [employer]. Care instructions adapted under license by [facility] ([facility]). If you have questions about a medical condition or this instruction, always ask your healthcare professional. [address] any warranty or liability for your use of this information. Patient Education        Recovering From Depression: Care Instructions  Your Care Instructions     Taking good care of yourself is important as you recover from depression. In time, your symptoms will fade as your treatment takes hold. Do not give up. Instead, focus your energy on getting better. Your mood will improve. It just takes some time. Focus on things that can help you feel better, such as being with friends and family, eating well, and getting enough rest. But take things slowly. Do not do too much too soon. Youwill begin to feel better gradually.   Follow-up care is a key part of your treatment and safety. Be sure to make and go to all appointments, and call your doctor if you are having problems. It's also a good idea to know your test results and keep alist of the medicines you take. How can you care for yourself at home? Be realistic   If you have a large task to do, break it up into smaller steps you can handle, and just do what you can.  You may want to put off important decisions until your depression has lifted. If you have plans that will have a major impact on your life, such as marriage, divorce, or a job change, try to wait a bit. Talk it over with friends and loved ones who can help you look at the overall picture first.   Reaching out to people for help is important. Do not isolate yourself. Let your family and friends help you. Find someone you can trust and confide in, and talk to that person.  Be patient, and be kind to yourself. Remember that depression is not your fault and is not something you can overcome with willpower alone. Treatment is important for depression, just like for any other illness. Feeling better takes time, and your mood will improve little by little. Stay active   Stay busy and get outside. Take a walk, or try some other light exercise.  Talk with your doctor about an exercise program. Exercise can help with mild depression.  Go to a movie or concert. Take part in a Scientologist activity or other social gathering. Go to a ball game.  Ask a friend to have dinner with you. Take care of yourself   Eat a balanced diet with plenty of fresh fruits and vegetables, whole grains, and lean protein. If you have lost your appetite, eat small snacks rather than large meals.  Avoid using illegal drugs or marijuana and drinking alcohol. Do not take medicines that have not been prescribed for you. They may interfere with medicines you may be taking for depression, or they may make your depression worse.  Take your medicines exactly as they are prescribed.  You may start to feel better within 1 to 3 weeks of taking antidepressant medicine. But it can take as many as 6 to 8 weeks to see more improvement. If you have questions or concerns about your medicines, or if you do not notice any improvement by 3 weeks, talk to your doctor.  Continue to take your medicine after your symptoms improve. Taking your medicine for at least 6 months after you feel better can help keep you from getting depressed again. If this isn't the first time you have been depressed, your doctor may recommend you to take medicine even longer.  If you have any side effects from your medicine, tell your doctor. Many side effects are mild and will go away on their own after you have been taking the medicine for a few weeks. Some may last longer. Talk to your doctor if side effects are bothering you too much. You might be able to try a different medicine.  Continue counseling. It may help prevent depression from returning, especially if you've had multiple episodes of depression. Talk with your counselor if you are having a hard time attending your sessions or you think the sessions aren't working. Don't just stop going.  Get enough sleep. Talk to your doctor if you are having problems sleeping.  Avoid sleeping pills unless they are prescribed by the doctor treating your depression. Sleeping pills may make you groggy during the day, and they may interact with other medicine you are taking.  If you have any other illnesses, such as diabetes, heart disease, or high blood pressure, make sure to continue with your treatment. Tell your doctor about all of the medicines you take, including those with or without a prescription.  If you or someone you know talks about suicide, self-harm, or feeling hopeless, get help right away. Call the [facility] at 1-800-273-talk (9-156.260.3618) or text HOME to 672287 to access the Crisis Text Line.  Consider saving these numbers in your phone.  When should you call for help? Call 911 anytime you think you may need emergency care. For example, call if:     You feel like hurting yourself or someone else.      Someone you know has depression and is about to attempt or is attempting suicide. Call your doctor now or seek immediate medical care if:     You hear voices.      Someone you know has depression and:  ? Starts to give away his or her possessions. ? Uses illegal drugs or drinks alcohol heavily. ? Talks or writes about death, including writing suicide notes or talking about guns, knives, or pills. ? Starts to spend a lot of time alone. ? Acts very aggressively or suddenly appears calm. Watch closely for changes in your health, and be sure to contact your doctor if:     You do not get better as expected. Where can you learn more? Go to https://Vue Technologypepiceweb.Rent [email or web address] org and sign in to your [employer] Internet account. Enter O197 in the [employer] box to learn more about \"Recovering From Depression: Care Instructions. \"     If you do not have an account, please click on the \"Sign Up Now\" link. Current as of: June 16, 2021               Content Version: 13.2  © 7089-9580 Printed Piece. Care instructions adapted under license by [employer]. If you have questions about a medical condition or this instruction, always ask your healthcare professional. [place] [address] any warranty or liability for your use of this information. Patient Education        Post-Traumatic Stress Disorder (PTSD): Care Instructions  Overview     Post-traumatic stress disorder (PTSD) is a mental health problem that can result from being in or seeing a traumatic or terrifying event. These events can include combat, a terrorist attack, a natural disaster, a serious accident, an assault, or a rape. If you have PTSD, you may often relive the experience in nightmares or flashbacks.  These are clear and frightening memories of theevent. You may also have trouble sleeping. PTSD affects people in very different ways. It can interfere with daily activities such as work or school, and it can make you withdraw from friends orloved ones. Follow-up care is a key part of your treatment and safety. Be sure to make and go to all appointments, and call your doctor if you are having problems. It's also a good idea to know your test results and keep alist of the medicines you take. How can you care for yourself at home?  Take your medicines exactly as they are prescribed. If you are taking an antidepressant, you may start to feel better within 1 to 3 weeks. But it can take as many as 6 to 8 weeks to see more improvement. If you have questions or concerns about your medicines, or if you don't notice any improvement after 3 weeks, talk to your doctor.  Go to your counseling sessions and follow-up appointments.  Recognize and accept your anxiety. Then, when you are in a situation that makes you anxious, say to yourself, \"This is not an emergency. I feel uncomfortable, but I am not in danger. I can keep going even if I feel anxious. \"   Be kind to your body:  ? Get enough rest.  ? Get at least 30 minutes of exercise on most days of the week. Walking is a good choice. You also may want to do other activities, such as running, swimming, cycling, or playing tennis or team sports. ? Avoid alcohol, caffeine, nicotine, marijuana, and illegal drugs. They can make your symptoms worse. ? Learn and do relaxation techniques. See below for more about these techniques.  Keep a record of your symptoms. Discuss your fears with a good friend or family member, or join a support group for people with similar problems. Talking to others sometimes relieves stress.  Connect with others. Get involved in social groups, or volunteer to help others. Or get out and do something you enjoy. Watch a movie, or take a walk or hike with a friend.    Keep the numbers for these national suicide hotlines: 6-481-706-TALK (8-549.802.7730) and 4-555-ISFJDNM (6-208.365.5733). If you or someone you know talks about suicide or feeling hopeless, get help right away. Relaxation techniques  Do relaxation exercises 10 to 20 minutes a day. You can play soothing, relaxingmusic while you do them, if you wish.  Tell others in your house that you are going to do your relaxation exercises. Ask them not to disturb you.  Find a comfortable place, away from all distractions and noise.  Lie down on your back, or sit with your back straight.  Focus on your breathing. Make it slow and steady.  Breathe in through your nose. Breathe out through either your nose or mouth.  Breathe deeply, filling up the area between your navel and your rib cage. Breathe so that your belly goes up and down.  Do not hold your breath.  Breathe like this for 5 to 10 minutes. Notice the feeling of calmness throughout your whole body. As you continue to breathe slowly and deeply, relax by doing the following foranother 5 to 10 minutes:   Tighten and relax each muscle group in your body. You can begin at your toes and work your way up to your head.  Imagine your muscle groups relaxing and becoming heavy.  Empty your mind of all thoughts.  Let yourself relax more and more deeply.  Become aware of the state of calmness that surrounds you.  When your relaxation time is over, you can bring yourself back to alertness by moving your fingers and toes and then your hands and feet and then stretching and moving your entire body. Sometimes people fall asleep during relaxation, but they usually wake up shortly afterward.  Always give yourself time to return to full alertness before you drive a car or do anything that might cause an accident if you are not fully alert. Never play a relaxation tape while you drive a car. When should you call for help?    Call 911 anytime you think you may need emergency care. For example, call if:     You feel you cannot stop from hurting yourself or someone else. Call the [address] [place] [address] at [phone] if you orsomeone you know is:     Feeling hopeless or thinking of hurting or killing themselves. Watch closely for changes in your health, and be sure to contact your doctor if:     Your PTSD symptoms are getting worse.      You have new or worse symptoms of anxiety or depression.      You are not getting better as expected. Where can you learn more? Go to https://ClozepeMultigig.Respirics. org and sign in to your Naked account. Enter C478 in the mytheresa.com box to learn more about \"Post-Traumatic Stress Disorder (PTSD): Care Instructions. \"     If you do not have an account, please click on the \"Sign Up Now\" link. Current as of: June 16, 2021               Content Version: 13.2  © 8193-6248 [employer]. Care instructions adapted under license by [facility] ([facility]). If you have questions about a medical condition or this instruction, always ask your healthcare professional. [place] [address] any warranty or liability for your use of this information. Patient Education        Learning About Making a Suicide Safety Plan  Overview     A safety plan is a set of steps you can take when you feel suicidal. It includes your warning signs, coping strategies, and people to ask for support. You can write your own safety plan or use a free phone ed. But it's best towork with a therapist to make your plan. How can you make and use your plan? If you feel like you can't keep from hurting yourself or someone else, get help right away. Call 911 or the National Suicide Prevention Lifeline: 3-062-145-TALK (5-103.812.2072). Or text HOME to 112242 to access the Crisis Text Line. Having a safety plan is important for anyone who thinks about suicide.  It can help you (or someone you care about) get safely through times of crisis. If possible, try to make your plan during a time when you aren't in a crisis soit's ready when you need it. To use the plan, move through it step-by-step. So first, check your warning signs. Then try your own coping strategies. If those don't help, move through the rest of the steps until you have the help you need to get through thecrisis. Here's how to make a safety plan. 1. Make a list of your crisis warning signs. What happens when you start to think about suicide? Make a list of your warningsigns--the things you think, feel, or do when you start to feel suicidal.  2. List your personal coping strategies. What can you do or think about to avoid acting when you feel suicidal? This may include your reason(s) to live. Rank these ideas by how well you think they'll work. What might keep you from using them? What might make you more likely touse them? 3. Come up with some sources of support and distraction. Think of people and places that could help shift your attention away from painful feelings or thoughts of dying. This may include children you care aboutor a safe social space, like a coffee shop or a bookstore. 4. Make a list of people you can count on for help. Think about who you could contact in a crisis. Who do you trust and confide in? Who is always there when you need them? This might be a friend, a family member, or someone else, like a caregiver or . If no one comes to mind, that's okay. Be sure you have some professional support, such as a doctor orcounselor. 5. List your professional sources of support. This may include your doctor or therapist, local emergency rooms, and local crisis hotlines. Also include the National Suicide Prevention Lifeline: Call 7-646.289.4876 or text HOME to 006259. Be sure to save these numbers in yourphone. 6. Think through ways to keep yourself safe. Do you have weapons or other means to hurt yourself?  Consider how to limit your access to them. For example, if you have a gun, maybe you could ask a friend orfamily member to lock it up for you. When should you call for help? Call 911 anytime you think you may need emergency care. For example, call if:     You feel you cannot stop from hurting yourself or someone else. Call your doctor now or seek immediate medical care if:     You have one or more warning signs of suicide. For example, call if:  ? You feel like giving away your possessions. ? You use illegal drugs or drink alcohol heavily. ? You talk or write about death. This may include writing suicide notes and talking about guns, knives, or pills. ? You start to spend a lot of time alone or spend more time alone than usual.      You hear voices.      You start acting in an aggressive way that's not normal for you. Watch closely for changes in your health, and be sure to contact your doctor ifyou have any problems. Where can you learn more? Go to https://SqulapeBeamz Interactiveeb.TokBox. org and sign in to your [employer] account. Enter l123 in the dotCloud box to learn more about \"Learning About Making a Suicide Safety Plan. \"     If you do not have an account, please click on the \"Sign Up Now\" link. Current as of: June 17, 2021               Content Version: 13.2  © 4440-1273 Healthwise, [employer]. Care instructions adapted under license by [facility] ([facility]). If you have questions about a medical condition or this instruction, always ask your healthcare professional. [place] [address] any warranty or liability for your use of this information.

## 2022-07-12 PROBLEM — G47.00 INSOMNIA, PERSISTENT: Status: ACTIVE | Noted: 2022-07-12

## 2022-07-14 DIAGNOSIS — F32.1 MAJOR DEPRESSIVE DISORDER, SINGLE EPISODE, MODERATE (HCC): ICD-10-CM

## 2022-07-14 RX ORDER — QUETIAPINE FUMARATE 300 MG/1
300 TABLET, FILM COATED ORAL 2 TIMES DAILY
Qty: 60 TABLET | Refills: 0 | Status: SHIPPED | OUTPATIENT
Start: 2022-07-14 | End: 2022-07-22 | Stop reason: ALTCHOICE

## 2022-07-14 NOTE — TELEPHONE ENCOUNTER
I will refill this one time, however she needs to make a follow up appointment.    Thank you,  Aria Martinez

## 2022-07-14 NOTE — TELEPHONE ENCOUNTER
Enrrique Bowling called to request an increase in Seroquel. Jose Hudsono that she missed her follow up appointment yesterday. She reports she forgot about the appointment. R/S for 07/22/2022. Enrrique Bowling reports she has been out of Seroquel for at least \"3-4 days\". She was taking 300 mg bid and is requesting 400 mg bid. She reports that she would take an \"extra\" seroquel when she was feeling \"really bad\"-depressed. She denies any side effects since abruptly stopping the medication. Please advise.

## 2022-07-21 NOTE — PROGRESS NOTES
[address] [facility] [address]  Lewis petra Akers  [facility] [place] [address]  Dept: [phone]  Dept Fax: [phone]  Loc: [phone]    Visit Date: 7/22/2022    SUBJECTIVE DATA       CHIEF COMPLAINT:    Chief Complaint   Patient presents with    Anxiety    Depression    Follow-up    Medication Check    Stress          History obtained from: patient    HISTORY OF PRESENT ILLNESS:    Jeremias Mayo is a 61 y.o. female who presents to the office for follow up with medication management. HPI  Patient had connection issues, unable to connect to [employer] for video visit after multiple attempts. Staff assisted patient without success. Therefore provider had to call patient and complete a telephone encounter. Patient agreed to be seen in the office for follow up visits. Patient presents anxious and somewhat cooperative. Patient states feeling \"no good\" today. Stated she is taking propranolol and it's not working. Stated she gets angry and irritable. Patient stated \"the only thing that works is Klonopin and no one will give it to me. No one will help me\". Repeated multiple times this provider won't prescribe Klonopin or other benzodiazepines and prescribing other medication to help with anxiety and depression. Patient stated \"no use in trying to help me if you can't give me those\" referring to the benzodiazepines. Patients speech sounds slurred, however she denies using substances. Patient stated the antidepressants \"take too long to work\". Patient endorses taking seroquel and propranolol. Discussed medication management with patient. Patient agreed with plan to switch to seroquel XR, instead of immediate release, once daily dosing, continue propranolol at lower dose, and increase Lexapro.      Patient rates depression 10/10 and anxiety 10/10 today on a scale of 0-10 with 10 being the worst.  Sleep- not able to fall asleep, or stay asleep, not feeling rested  Interest- diminished  Energy and motivation - diminished  Concentration - diminished  Memory -poor  Appetite- poor  endorses irritability  endorses restlessness  Endorses nightmares  Endorses hopelessness, helplessness, and worthlessness  Denies suicidal or homicidal ideation, plan, or intent. Patient stated she contracts with safety with her boyfriend  Endorses visual hallucinations, which she stated have always been present. Denies auditory hallucinations. New medical conditions or psychiatric admissions since seen by this provider last? Denies  Patient endorses currently taking the following psychiatric medications: Lexapro 10mg, Seroquel 300mg BID, Propranolol 60mg   Adverse reactions from psychotropic medications:  Denies  Past psychiatric medications include: Amitryptaline, Xanax, Ambien, Zoloft, Hydroxyzine \"didn't work\", Seroquel-(which she stated worked for her), Trazodone \"didn't work\", other unknown medications. Patient states she cannot take Buspar as it made hallucinations worse. Patient Assets/Support system:   boyfriend  Endorsed coping skills: distraction, watch tv  The patient endorses feeling safe at home Yes  Current Substance Use: Denies    Unable to perform a full AIMS assessment related to limitations of tele-technology, but no involuntary or abnormal movements or other symptoms noted or reported during the tele assessment today. Motivational Interviewing and Cognitive Behavioral Therapy utilized, including behavioral modification, insight oriented, and supportive therapy. Patient is encouraged to utilize nonpharmacologic coping skills such as deep breathing, guided imagery, guided meditation, muscle relaxation, calming music, and/or journaling. Records review of communications , labs, and medications from an internal/external source completed. ALLERGIES:    Patient has no known allergies.      PAST MEDICAL HISTORY:    Past Medical History:   Diagnosis Date    Anxiety COPD (chronic obstructive pulmonary disease) (HCC)     Hypertension     Osteoporosis     Thyroid disease        PREVIOUSMEDICATIONS:  Outpatient Medications Prior to Visit   Medication Sig Dispense Refill    QUEtiapine (SEROQUEL) 300 MG tablet Take 1 tablet by mouth 2 times daily 60 tablet 0    escitalopram (LEXAPRO) 10 MG tablet Take 1 tablet by mouth daily 30 tablet 0    propranolol (INDERAL LA) 60 MG extended release capsule Take 1 capsule by mouth daily 30 capsule 0    metoprolol tartrate (LOPRESSOR) 50 MG tablet Take 50 mg by mouth 2 times daily      levothyroxine (SYNTHROID) 75 MCG tablet Take 75 mcg by mouth Daily       No facility-administered medications prior to visit. REVIEW OF SYSTEMS:    Review of Systems   Constitutional:  Positive for appetite change and fatigue. Psychiatric/Behavioral:  Positive for agitation, decreased concentration, dysphoric mood, sleep disturbance and suicidal ideas. The patient is nervous/anxious. The patient sees Pedro Rodríguez as her primary care provider.     OBJECTIVE DATA     Wt Readings from Last 3 Encounters:   04/14/22 184 lb 6.4 oz (83.6 kg)   09/12/18 122 lb (55.3 kg)   07/26/18 120 lb (54.4 kg)        Mental Status Exam:   Level of consciousness:  within normal limits  Appearance:  unable to determine on telephone visit   Behavior/Motor:  unable to determine on telephone visit   Attitude toward examiner:  somewhat cooperative and attentive  Speech:  spontaneous, normal rate and normal volume, slurred  Mood:  \"no good\" per patient   Dysthymic and Sad, irritable  Affect: unable to determine on telephone visit  Thought processes:  linear, goal directed toward getting Klonopin, and somewhat coherent  Thought content:  Denies homicidal ideation  Suicidal Ideation:  denies suicidal ideation  Delusions:  no evidence of delusions  Perceptual Disturbance:  visual  Cognition: Patient is oriented to person, place, time and situation  Concentration: poor  Memory: limited  Insight & Judgement: limited   Medication Side Effects: Absent  Attention Span: Attention span and concentration were age appropriate    Screenings Completed in This Encounter:     Anxiety and Depression:      BETY-7 SCREENING 7/22/2022 6/3/2022 5/18/2022   Feeling nervous, anxious, or on edge Nearly every day More than half the days Nearly every day   Not being able to stop or control worrying Nearly every day More than half the days Nearly every day   Worrying too much about different things Nearly every day More than half the days Nearly every day   Trouble relaxing Nearly every day Nearly every day Nearly every day   Being so restless that it is hard to sit still Nearly every day Several days Nearly every day   Becoming easily annoyed or irritable Nearly every day Nearly every day Nearly every day   Feeling afraid as if something awful might happen Nearly every day Not at all Several days   BETY-7 Total Score 21 13 19   How difficult have these problems made it for you to do your work, take care of things at home, or get along with other people? Extremely difficult Extremely difficult Very difficult           PHQ-2 Over the past 2 weeks, how often have you been bothered by any of the following problems? Little interest or pleasure in doing things: Nearly every day  Feeling down, depressed, or hopeless: More than half the days  PHQ-2 Score: 5  PHQ-9 Over the past 2 weeks, how often have you been bothered by any of the following problems?   Trouble falling or staying asleep, or sleeping too much: More than half the days  Feeling tired or having little energy: More than half the days  Poor appetite or overeating: Nearly every day  Feeling bad about yourself - or that you are a failure or have let yourself or your family down: Nearly every day  Trouble concentrating on things, such as reading the newspaper or watching television: Nearly every day  Moving or speaking so slowly that other people could have noticed. Or the opposite - being so fidgety or restless that you have been moving around a lot more than usual: Nearly every day  Thoughts that you would be better off dead, or of hurting yourself in some way: Not at all  If you checked off any problems, how difficult have these problems made it for you to do your work, take care of things at home, or get along with other people?: Extremely dIfficult  PHQ-9 Total Score: 21  PHQ-9 Total Score: 21       DIAGNOSIS AND ASSESSMENT DATA     DSM-5 DIAGNOSIS:   1. Major depressive disorder, single episode, moderate ([facility] [address]    2. BETY (generalized anxiety disorder)    3. Chronic post-traumatic stress disorder (PTSD)    4. Insomnia, persistent      Rule Out Panic Disorder  Rule Out Bereavement Disorder  Rule Out Obstructive Sleep Apnea     Psychosocial and Contextual Factors[de-identified]  Financial  Occupational  Relationships  Legal  Living situation  Educational    PLAN   Follow-up:  Return in about 2 weeks (around 8/5/2022) for anxiety, depression, medication management, irritability, insomnia, fatigue. Stop Seroquel IR and   start Seroquel XR 300mg at night for irritability/mood, depression   Decrease Propanolol to 20mg two times daily as needed for anxiety  Increase Lexapro to 20mg in morning for anxiety and depression  Consider psychotherapy   Exercise 30 minutes 3-4 times per week  Increase fluids, drink at least 8 glasses of water daily, no caffeine after 3pm  Sleep hygiene  Healthy diet    Prescriptions for this encounter:  New Prescriptions    PROPRANOLOL (INDERAL) 20 MG TABLET    Take 1 tablet by mouth in the morning and 1 tablet at noon and 1 tablet before bedtime. QUETIAPINE (SEROQUEL XR) 300 MG EXTENDED RELEASE TABLET    Take 1 tablet by mouth in the morning and at bedtime       Orders Placed This Encounter   Medications    escitalopram (LEXAPRO) 20 MG tablet     Sig: Take 1 tablet by mouth in the morning.      Dispense:  30 tablet     Refill:  1    DISCONTD: QUEtiapine (SEROQUEL XR) 300 MG extended release tablet     Sig: Take 1 tablet by mouth in the morning. Take at night before bedtime. Dispense:  30 tablet     Refill:  0    propranolol (INDERAL) 20 MG tablet     Sig: Take 1 tablet by mouth in the morning and 1 tablet at noon and 1 tablet before bedtime. Dispense:  90 tablet     Refill:  0       Medications Discontinued During This Encounter   Medication Reason    QUEtiapine (SEROQUEL) 300 MG tablet Therapy completed    escitalopram (LEXAPRO) 10 MG tablet     propranolol (INDERAL LA) 60 MG extended release capsule Therapy completed       Additional orders:  No orders of the defined types were placed in this encounter. Antipsychotic Medication: We discussed the risks/benefits and side effects of medications. I stressed in particular side effects including but not limited to metabolic syndrome, weight gain, increased prolactin levels, tardive dyskinesia, QTc prolongation, neuroleptic malignant syndrome, excessive somnolence, or confusion. Antidepressant Medications: We discussed the risks/benefits and side effects of medications. I stressed in particular side effects including but not limited to gastrointestinal problems, sexual dysfunction, serotonin syndrome, agitation, rare induction of jax, rare activation of suicidality. We discussed the effects alcohol and illicit substances have on mood, thought process, physical health and interactions with medications. Discussed the side effects of nicotine and caffeine in sleep disturbance and anxiety. The client and I reviewed several treatment options to address his/her symptoms. I explained the risks/benefits of treatment with and without medication. The patient participated in and indicated understanding of the content of our discussion by agreeing to the mutually developed treatment plan.   Risks, potential side effects, possible drug-drug interactions, benefits and alternate treatments discussed in detail. All questions answered. Patient stated understanding and is agreeable to treatment plan. Patient has been instructed to seek emergency help via the emergency and/or calling 911 should symptoms become severe, worsen, or with other concerning symptoms. Patient instructed to go immediately to the emergency room and/or call 911 with any suicidal or homicidal ideations or if audio/visual hallucinations develop. Patient stated understanding and agrees. Patient given crisis center information. I spent a total of 30 minutes with the patient and over half of that time was spent on counseling and coordination of care regarding topics discussed above. I spent 20 minutes with the patient for psychotherapy. The patient was engaged and responsive throughout session. Utilized CBT, MI and reflective listening to address topics above. The remainder of session spent on symptom evaluation and medication management. Provider Signature:  Electronically signed by EDD Dumont CNP on 7/22/2022 at 4:04 PM    **This report has been created using voice recognition software. It may contain minor errors which are inherent in voice recognition technology. **     Suedep Jacob, was evaluated through a synchronous (real-time) Telephone encounter. The patient (or guardian if applicable) is aware that this is a billable service, which includes applicable co-pays. This Virtual Visit was conducted with patient's (and/or legal guardian's) consent. The visit was conducted pursuant to the emergency declaration under the [facility], [address] [place] [address] [facility] authority and the [employer] and [employer] General Act. Patient identification was verified, and a caregiver was present when appropriate. The patient was located at Home: [facility] [address]. Provider was located at Home ([facility]): [facility].         Total time spent for this encounter:  Not billed by time    --EDD Alarcon - CNP on 7/22/2022 at 4:04 PM    An electronic signature was used to authenticate this note.

## 2022-07-22 ENCOUNTER — TELEMEDICINE (OUTPATIENT)
Dept: PSYCHIATRY | Age: 67
End: 2022-07-22

## 2022-07-22 ENCOUNTER — TELEPHONE (OUTPATIENT)
Dept: PSYCHIATRY | Age: 67
End: 2022-07-22

## 2022-07-22 DIAGNOSIS — F41.1 GAD (GENERALIZED ANXIETY DISORDER): ICD-10-CM

## 2022-07-22 DIAGNOSIS — F43.12 CHRONIC POST-TRAUMATIC STRESS DISORDER (PTSD): ICD-10-CM

## 2022-07-22 DIAGNOSIS — F32.1 MAJOR DEPRESSIVE DISORDER, SINGLE EPISODE, MODERATE (HCC): Primary | ICD-10-CM

## 2022-07-22 DIAGNOSIS — F32.1 MAJOR DEPRESSIVE DISORDER, SINGLE EPISODE, MODERATE (HCC): ICD-10-CM

## 2022-07-22 DIAGNOSIS — G47.00 INSOMNIA, PERSISTENT: ICD-10-CM

## 2022-07-22 PROCEDURE — 99443 PR PHYS/QHP TELEPHONE EVALUATION 21-30 MIN: CPT

## 2022-07-22 RX ORDER — QUETIAPINE 300 MG/1
300 TABLET, FILM COATED, EXTENDED RELEASE ORAL NIGHTLY
Qty: 30 TABLET | Refills: 0 | Status: SHIPPED | OUTPATIENT
Start: 2022-07-22 | End: 2022-08-29 | Stop reason: SDUPTHER

## 2022-07-22 RX ORDER — QUETIAPINE 300 MG/1
300 TABLET, FILM COATED, EXTENDED RELEASE ORAL DAILY
Qty: 30 TABLET | Refills: 0 | Status: SHIPPED | OUTPATIENT
Start: 2022-07-22 | End: 2022-07-22

## 2022-07-22 RX ORDER — QUETIAPINE 300 MG/1
300 TABLET, FILM COATED, EXTENDED RELEASE ORAL 2 TIMES DAILY
Qty: 60 TABLET | Refills: 0 | Status: SHIPPED | OUTPATIENT
Start: 2022-07-22 | End: 2022-07-22

## 2022-07-22 RX ORDER — ESCITALOPRAM OXALATE 20 MG/1
20 TABLET ORAL DAILY
Qty: 30 TABLET | Refills: 1 | Status: SHIPPED | OUTPATIENT
Start: 2022-07-22

## 2022-07-22 RX ORDER — PROPRANOLOL HYDROCHLORIDE 20 MG/1
20 TABLET ORAL 3 TIMES DAILY
Qty: 90 TABLET | Refills: 0 | Status: SHIPPED | OUTPATIENT
Start: 2022-07-22

## 2022-07-22 ASSESSMENT — PATIENT HEALTH QUESTIONNAIRE - PHQ9
2. FEELING DOWN, DEPRESSED OR HOPELESS: 2
8. MOVING OR SPEAKING SO SLOWLY THAT OTHER PEOPLE COULD HAVE NOTICED. OR THE OPPOSITE, BEING SO FIGETY OR RESTLESS THAT YOU HAVE BEEN MOVING AROUND A LOT MORE THAN USUAL: 3
1. LITTLE INTEREST OR PLEASURE IN DOING THINGS: 3
9. THOUGHTS THAT YOU WOULD BE BETTER OFF DEAD, OR OF HURTING YOURSELF: 0
4. FEELING TIRED OR HAVING LITTLE ENERGY: 2
SUM OF ALL RESPONSES TO PHQ QUESTIONS 1-9: 21
7. TROUBLE CONCENTRATING ON THINGS, SUCH AS READING THE NEWSPAPER OR WATCHING TELEVISION: 3
SUM OF ALL RESPONSES TO PHQ9 QUESTIONS 1 & 2: 5
3. TROUBLE FALLING OR STAYING ASLEEP: 2
10. IF YOU CHECKED OFF ANY PROBLEMS, HOW DIFFICULT HAVE THESE PROBLEMS MADE IT FOR YOU TO DO YOUR WORK, TAKE CARE OF THINGS AT HOME, OR GET ALONG WITH OTHER PEOPLE: 3
SUM OF ALL RESPONSES TO PHQ QUESTIONS 1-9: 21
6. FEELING BAD ABOUT YOURSELF - OR THAT YOU ARE A FAILURE OR HAVE LET YOURSELF OR YOUR FAMILY DOWN: 3
SUM OF ALL RESPONSES TO PHQ QUESTIONS 1-9: 21
5. POOR APPETITE OR OVEREATING: 3
SUM OF ALL RESPONSES TO PHQ QUESTIONS 1-9: 21

## 2022-07-22 ASSESSMENT — ANXIETY QUESTIONNAIRES
7. FEELING AFRAID AS IF SOMETHING AWFUL MIGHT HAPPEN: 3
1. FEELING NERVOUS, ANXIOUS, OR ON EDGE: 3
5. BEING SO RESTLESS THAT IT IS HARD TO SIT STILL: 3
6. BECOMING EASILY ANNOYED OR IRRITABLE: 3
2. NOT BEING ABLE TO STOP OR CONTROL WORRYING: 3
IF YOU CHECKED OFF ANY PROBLEMS ON THIS QUESTIONNAIRE, HOW DIFFICULT HAVE THESE PROBLEMS MADE IT FOR YOU TO DO YOUR WORK, TAKE CARE OF THINGS AT HOME, OR GET ALONG WITH OTHER PEOPLE: EXTREMELY DIFFICULT
GAD7 TOTAL SCORE: 21
4. TROUBLE RELAXING: 3
3. WORRYING TOO MUCH ABOUT DIFFERENT THINGS: 3

## 2022-07-22 NOTE — TELEPHONE ENCOUNTER
[address] called to clarify Rx for Seroquel XR. Sig: Take 1 tablet by mouth in the morning. Take at night before bedtime.    -Should Seroquel XR be taken at night or bid?

## 2022-07-22 NOTE — TELEPHONE ENCOUNTER
Sorry for the confusion. I wanted to switch it to the extended release and I would like her to take it once per day at night. I called the [address] and spoke to Germain Rowe Pharmacist to clarify.  Thank you,  Amrita Platt

## 2022-07-22 NOTE — TELEPHONE ENCOUNTER
[address] called back to clarify. Rx was sent for XR. They would like to make sure this is correct. She reports patient was previously on the immediate release bid. Please advise.

## 2022-08-11 ENCOUNTER — TELEPHONE (OUTPATIENT)
Dept: PSYCHIATRY | Age: 67
End: 2022-08-11

## 2022-08-11 NOTE — TELEPHONE ENCOUNTER
----- Message from EDD George CNP sent at 8/11/2022 10:27 AM EDT -----  Thank you. I wanted to add her multiple no shows include not completing video visits as she stated she couldn't get on the visit, when it has worked for her prior. Then asking for a telephone visit. She agreed to an in person visit as we can't continue telephone visits and no-showed. She also refuses treatment that is recommended and asks for Benzodiazepines at each visit that she knows I won't prescribe. Thank you,  Leon Jarrett  ----- Message -----  From: Benjamin Ngo MA  Sent: 8/10/2022   3:48 PM EDT  To: EDD George CNP    This has been placed in your mail box to sign. Thanks!  ----- Message -----  From: EDD George CNP  Sent: 8/10/2022   3:07 PM EDT  To: Benjamin Ngo MA    Please discharge patient for multiple no shows.    Thank you,  Leon Jarrett

## 2022-08-29 DIAGNOSIS — F32.1 MAJOR DEPRESSIVE DISORDER, SINGLE EPISODE, MODERATE (HCC): ICD-10-CM

## 2022-08-29 DIAGNOSIS — F43.12 CHRONIC POST-TRAUMATIC STRESS DISORDER (PTSD): ICD-10-CM

## 2022-08-29 DIAGNOSIS — F41.1 GAD (GENERALIZED ANXIETY DISORDER): ICD-10-CM

## 2022-08-29 RX ORDER — QUETIAPINE 300 MG/1
300 TABLET, FILM COATED, EXTENDED RELEASE ORAL NIGHTLY
Qty: 30 TABLET | Refills: 0 | Status: SHIPPED | OUTPATIENT
Start: 2022-08-29 | End: 2022-09-28

## 2022-08-29 NOTE — TELEPHONE ENCOUNTER
Divya Prabhakar called requesting a refill on her Seroquel. Advised that as of 08/10/2022 patient was terminated from the practice. She reports that she did not receive a letter in the mail. Advised that provider would treat urgent needs 30 days from the date of the letter. She asks for refill. Advised that she would need to follow up with PCP for further refills. Patient voiced understanding.

## 2023-01-17 ENCOUNTER — TELEPHONE (OUTPATIENT)
Dept: WOUND CARE | Age: 68
End: 2023-01-17

## 2023-01-17 NOTE — TELEPHONE ENCOUNTER
Received referral from Dr. Vanesa Casey. Called to clarify wound care. Patient current with [facility] and has a wound vac but they are planning to discharge due to patients living conditions. Offered appointment tomorrow but R[place] at [place] [address] office stated [facility] is having a  come out tomorrow. Gave next available appointment of 1/25/23 at [address] to change wound vac tomorrow and send over information.  office aware we will be taking over wound care and wound vac.

## 2023-01-18 ENCOUNTER — TELEPHONE (OUTPATIENT)
Dept: WOUND CARE | Age: 68
End: 2023-01-18

## 2023-01-18 NOTE — TELEPHONE ENCOUNTER
Spoke to [facility] regarding patients current wound care. Home health states they are currently applying a wound vac 3 times per week but the patients living conditions are poor. Home health states the patient has a severe owen problem and the house is not clean and they do not feel comfortable applying the wound vac due to it being unsanitary. [facility] plans to apply the wound vac today and discharge on Friday. Called  office as he is currently following the wound vac and discussed the issue of the wound vac not being changed from Friday until we see on Wednesday. Office to discuss with Dr. Tatum Ruiz and call us back.

## 2023-01-26 ENCOUNTER — HOSPITAL ENCOUNTER (OUTPATIENT)
Dept: WOUND CARE | Age: 68
Discharge: HOME | End: 2023-01-26
Payer: MEDICARE

## 2023-01-26 VITALS
DIASTOLIC BLOOD PRESSURE: 71 MMHG | TEMPERATURE: 97.5 F | HEART RATE: 97 BPM | RESPIRATION RATE: 20 BRPM | SYSTOLIC BLOOD PRESSURE: 162 MMHG | OXYGEN SATURATION: 96 %

## 2023-01-26 DIAGNOSIS — T81.89XA NON-HEALING SURGICAL WOUND, INITIAL ENCOUNTER: ICD-10-CM

## 2023-01-26 PROBLEM — E03.9 HYPOTHYROIDISM: Status: ACTIVE | Noted: 2023-01-26

## 2023-01-26 PROBLEM — Z95.828 HISTORY OF ARTERIAL BYPASS OF LOWER EXTREMITY: Status: ACTIVE | Noted: 2023-01-26

## 2023-01-26 PROBLEM — I10 HYPERTENSION: Status: ACTIVE | Noted: 2023-01-26

## 2023-01-26 PROBLEM — E66.3 OVERWEIGHT: Status: ACTIVE | Noted: 2023-01-26

## 2023-01-26 PROBLEM — I73.9 PERIPHERAL ARTERIAL DISEASE (HCC): Status: ACTIVE | Noted: 2023-01-26

## 2023-01-26 PROCEDURE — 99203 OFFICE O/P NEW LOW 30 MIN: CPT | Performed by: NURSE PRACTITIONER

## 2023-01-26 PROCEDURE — 99213 OFFICE O/P EST LOW 20 MIN: CPT

## 2023-01-26 RX ORDER — LIDOCAINE HYDROCHLORIDE 40 MG/ML
SOLUTION TOPICAL ONCE
OUTPATIENT
Start: 2023-01-26 | End: 2023-01-26

## 2023-01-26 RX ORDER — BACITRACIN ZINC AND POLYMYXIN B SULFATE 500; 1000 [USP'U]/G; [USP'U]/G
OINTMENT TOPICAL ONCE
OUTPATIENT
Start: 2023-01-26 | End: 2023-01-26

## 2023-01-26 RX ORDER — LIDOCAINE 40 MG/G
CREAM TOPICAL ONCE
OUTPATIENT
Start: 2023-01-26 | End: 2023-01-26

## 2023-01-26 RX ORDER — LIDOCAINE HYDROCHLORIDE 20 MG/ML
JELLY TOPICAL ONCE
OUTPATIENT
Start: 2023-01-26 | End: 2023-01-26

## 2023-01-26 RX ORDER — BACITRACIN, NEOMYCIN, POLYMYXIN B 400; 3.5; 5 [USP'U]/G; MG/G; [USP'U]/G
OINTMENT TOPICAL ONCE
OUTPATIENT
Start: 2023-01-26 | End: 2023-01-26

## 2023-01-26 RX ORDER — GINSENG 100 MG
CAPSULE ORAL ONCE
OUTPATIENT
Start: 2023-01-26 | End: 2023-01-26

## 2023-01-26 RX ORDER — LIDOCAINE 50 MG/G
OINTMENT TOPICAL ONCE
OUTPATIENT
Start: 2023-01-26 | End: 2023-01-26

## 2023-01-26 RX ORDER — GENTAMICIN SULFATE 1 MG/G
OINTMENT TOPICAL ONCE
OUTPATIENT
Start: 2023-01-26 | End: 2023-01-26

## 2023-01-26 RX ORDER — CLOBETASOL PROPIONATE 0.5 MG/G
OINTMENT TOPICAL ONCE
OUTPATIENT
Start: 2023-01-26 | End: 2023-01-26

## 2023-01-26 ASSESSMENT — PAIN SCALES - GENERAL: PAINLEVEL_OUTOF10: 8

## 2023-01-26 NOTE — PLAN OF CARE
Problem: Wound:  Goal: Will show signs of wound healing; wound closure and no evidence of infection  Description: Will show signs of wound healing; wound closure and no evidence of infection  Outcome: Progressing   Patient seen in clinic today for bilateral groin wounds. No s/s of infection. See AVS. Follow up in 3 week/s. Care plan reviewed with patient. Patient verbalize understanding of the plan of care and contribute to goal setting.

## 2023-01-26 NOTE — PROGRESS NOTES
[facility]  Consult and Procedure Note      [address] RECORD NUMBER:  [id number]  AGE: 74 y.o. GENDER: female  : 1955  EPISODE DATE:  2023    SUBJECTIVE:     Chief Complaint   Patient presents with    Wound Check         HISTORY OF PRESENT ILLNESS      Keara Pettit is a 74 y.o. female who presents today for evaluation of bilateral groin wounds. Patient recently underwent  femoral-femoral bypass surgery with Dr. Timi Rios. At follow up she was found to have infection and superficial fatty necrosis at incision lines bilaterally. She was then taken to OR for I&D pulse lavage 23 per Dr. Timi Rios. Wound vac was placed at that time. Today she reports extreme pain related to wound vac, states that she hasn't been sleeping at night due to pain. She is a current smoker, states that she has been actively working on quitting, is down to 4 cigarettes per day. She denies history of DM. She denies any fevers or chills. Denies any further needs or concerns. PAST MEDICAL HISTORY             Diagnosis Date    Anxiety     COPD (chronic obstructive pulmonary disease) ([facility] [address]     Hypertension     Osteoporosis     Thyroid disease        PAST SURGICAL HISTORY     Past Surgical History:   Procedure Laterality Date    HIP SURGERY Left        FAMILY HISTORY     History reviewed. No pertinent family history. SOCIAL HISTORY     Social History     Tobacco Use    Smoking status: Every Day     Packs/day: 0.25     Types: Cigarettes    Smokeless tobacco: Never    Tobacco comments:     Given in new patient packet   Substance Use Topics    Alcohol use: No    Drug use: No       ALLERGIES     No Known Allergies    MEDICATIONS     Current Outpatient Medications on File Prior to Encounter   Medication Sig Dispense Refill    QUEtiapine (SEROQUEL XR) 300 MG extended release tablet Take 1 tablet by mouth nightly 30 tablet 0    escitalopram (LEXAPRO) 20 MG tablet Take 1 tablet by mouth in the morning. (Patient not taking: Reported on 1/26/2023) 30 tablet 1    propranolol (INDERAL) 20 MG tablet Take 1 tablet by mouth in the morning and 1 tablet at noon and 1 tablet before bedtime. 90 tablet 0    metoprolol tartrate (LOPRESSOR) 50 MG tablet Take 50 mg by mouth 2 times daily      levothyroxine (SYNTHROID) 75 MCG tablet Take 75 mcg by mouth Daily       No current facility-administered medications on file prior to encounter. REVIEW OF SYSTEMS:     Constitutional: +pain-bilateral groin Denies fever, chills, night sweats, fatigue, weight loss/gain, loss of appetite   Head: Denies headache,  dizziness, loss of consciousness  Respiratory: Denies shortness of breath, cough, wheezing, dyspnea with exertion  Cardiovascular:Denies chest pain, palpitations, edema  Gastrointestinal: Denies nausea, vomiting, constipation, diarrhea, abdominal pain   TED: Denies dysuria, frequency, urgency, hematuria  Musculoskeletal: Denies joint pain, swelling , stiffness,  Endocrine: Denies polyuria, polydipsia, cold or heat intolerance  Hematology: Denies easy brusing or bleeding, hx of clotting disorder  Dermatology: +wound Denies skin rash, eczema, pruritis    PHYSICAL EXAM:     BP (!) 162/71   Pulse 97   Temp 97.5 °F (36.4 °C) (Infrared)   Resp 20   SpO2 96%   Wt Readings from Last 3 Encounters:   04/14/22 184 lb 6.4 oz (83.6 kg)   09/12/18 122 lb (55.3 kg)   07/26/18 120 lb (54.4 kg)       General:  Awake, alert, no apparent distress. Appears stated age  [de-identified]: conjuctivae are clear without exudate or hemorrhage, anicteric sclera, moist oral mucosa. Chest:  Respirations regular, non-labored. Chest rise and fall equal bilaterally. Neurological: Awake, alert, oriented x4  Psychiatric:  Appropriate mood and affect  Extremities: non-traumatic in appearance. Gait WNL  Skin:  Warm and dry  Wound:    Right groin wound bed granular with slough. Periwound macerated with blanchable erythema and induration.       Left groin wound bed granular with slough. Periwound indurated with blanchable erythema. Wound 01/26/23 Groin Right (Active)   Wound Image   01/26/23 1514   Wound Length (cm) 6.1 cm 01/26/23 1514   Wound Width (cm) 2.2 cm 01/26/23 1514   Wound Depth (cm) 0.3 cm 01/26/23 1514   Wound Surface Area (cm^2) 13.42 cm^2 01/26/23 1514   Wound Volume (cm^3) 4. 026 cm^3 01/26/23 1514   Wound Assessment Slough;Granulation tissue 01/26/23 1514   Odor Moderate 01/26/23 1514   Rebekah-wound Assessment Maceration;Blanchable erythema 01/26/23 1514   Margins Attached edges 01/26/23 1514   Wound Thickness Description not for Pressure Injury Full thickness 01/26/23 1514   Number of days: 0       Wound 01/26/23 Groin Left (Active)   Wound Image   01/26/23 1514   Wound Length (cm) 5.2 cm 01/26/23 1514   Wound Width (cm) 3.6 cm 01/26/23 1514   Wound Depth (cm) 0.2 cm 01/26/23 1514   Wound Surface Area (cm^2) 18.72 cm^2 01/26/23 1514   Wound Volume (cm^3) 3.744 cm^3 01/26/23 1514   Wound Assessment Slough;Granulation tissue 01/26/23 1514   Drainage Amount Moderate 01/26/23 1514   Drainage Description Serosanguinous; Yellow 01/26/23 1514   Odor Moderate 01/26/23 1514   Rebekah-wound Assessment Blanchable erythema; Intact; Induration;Dry/flaky 01/26/23 1514   Margins Attached edges 01/26/23 1514   Wound Thickness Description not for Pressure Injury Full thickness 01/26/23 1514   Number of days: 0       LABS/IMAGING     UA: No results for input(s): SPECGRAV, PHUR, COLORU, CLARITYU, MUCUS, PROTEINU, BLOODU, RBCUA, WBCUA, BACTERIA, NITRU, GLUCOSEU, BILIRUBINUR, UROBILINOGEN, KETUA, LABCAST, LABCASTTY, AMORPHOS in the last 72 hours.     Invalid input(s): CRYSTALS  Micro: No results found for: BC  ASSESSMENT     -nonhealing surgical wound, bilateral groins  -surgical wound dehiscence     Ulcer Identification:  Ulcer Type: non-healing surgical  Contributing Factors: obesity and smoking    Depth of Diabetic/Pressure/Non Pressure Ulcers or Wound:  Wound, full thickness Patient Active Problem List   Diagnosis Code    BETY (generalized anxiety disorder) F41.1    Chronic post-traumatic stress disorder (PTSD) F43.12    Insomnia related to another mental disorder F51.05    Major depressive disorder, single episode, moderate (HCC) F32.1    Insomnia, persistent G47.00    Nonhealing surgical wound T81.89XA    History of arterial bypass of lower extremity Z95.828    Hypertension I10    Hypothyroidism E03.9    Overweight E66.3    Peripheral arterial disease ([facility] [address] I73.9       PLAN     Patient examined and evaluated. All available lab work, radiology studies, and progress notes pertaining to Alistair Buckley reviewed prior to or during patient visit today.    -nonhealing surgical wound, bilateral groins, surgical wound dehiscence - Stop use of negative pressure wound therapy due to significant pain with use and trapping of drainage found under drape on removal leading to maceration. Wound bed granular with slough. Unable to debride due to extreme pain. Start use of hydroferra blue to wound bed to help promote breakdown of slough, cover with silicone bordered foam.  Patient reported decreased pain to wounds with use of this dressing. Change three times daily. Patient is unable to complete dressing changes independently due to location of wounds, states she does not have anyone at home to assist with wound care. She states that she is home bound aside from doctor's visits and essentials. Referral placed for home health care to assist with advanced wound care.      -No indications of infection noted at today's visit. Education provided on signs and symptoms of infection. Call clinic or seek emergency care should these occur. Alistair Buckley continues to smoke 0.4 cigarettes per day. Discussed importance of smoking cessation on improvement of general health.   Educated patient on the effects of smoking on wound progression and the importance of cutting back/quitting to promote better healing. Patient has been actively working on quitting. Encouragement provided to continue these efforts. Follow up 3 weeks. Call clinic with any needs or concerns prior to scheduled visit. All questions and concerns addressed prior to discharge from today's visit. Please see attached Discharge Instructions    Written patient dismissal instructions given to patient and signed by patient or POA. Treatment:   Orders Placed This Encounter   Procedures    [facility]     Referral Priority:   Routine     Referral Type:   [facility]     Referral Reason:   Specialty Services Required     Referral Location:   [facility]     Requested Specialty:   [address]     Number of Visits Requested:   1     I spent a total of 40 minutes face to face with Nestor Cagle  on 1/26/2023. Time spent includes review of previous progress notes, reviewing and discussing test results, education on plan of care for presenting diagnosis, answering questions, providing instructions on treatment and/or medications ordered, reviewing importance of compliance with outline treatment plan and any identifiable barriers to compliance and coordination of care based on plan and assessment as noted. Discharge Instructions         Discharge Instructions         Visit Discharge/Physician Orders:  - We will send referral for home health  - Send wound vac back to [facility] so you will not be charged    Wound Location: Bilateral groin    Dressing orders:     1) Gather wound care supplies and arrange on clean table. 2) Wash your hands with soap and water or use alcohol based hand  for 20 seconds (sing \"Happy Birthday\" twice). 3) Cleanse wounds with normal saline or wound cleanser and gauze. Pat dry with clean gauze. 4) Bilateral groin - Apply hydraferra blue to wound (cut to fit and moisten with saline) Cover with silicone bordered foam. Change 3 times weekly.       Keep all dressings clean & dry. Follow up visit:   3 Weeks on Monday 2/13/23 at [address]: [facility] to order supplies    Keep next scheduled appointment. Please give 24 hour notice if unable to keep appointment. [phone]    If you experience any of the following, please call the Wound Care Service during business hours: Monday through Friday 8:00 am - 4:30 pm  ([phone]). *Increase in pain   *Temperature over 101   *Increase in drainage from your wound or a foul odor   *Uncontrolled swelling   *Need for compression bandage changes due to slippage, breakthrough drainage    If you need medical attention outside of business hours, please contact your Primary Care Doctor or go to the nearest emergency room.                 Electronically signed by EDD Damon CNP on 1/26/2023 at 3:51 PM

## 2023-01-26 NOTE — DISCHARGE INSTRUCTIONS
Visit Discharge/Physician Orders:  - We will send referral for home health  - Send wound vac back to [facility] so you will not be charged    Wound Location: Bilateral groin    Dressing orders:     1) Gather wound care supplies and arrange on clean table. 2) Wash your hands with soap and water or use alcohol based hand  for 20 seconds (sing \"Happy Birthday\" twice). 3) Cleanse wounds with normal saline or wound cleanser and gauze. Pat dry with clean gauze. 4) Bilateral groin - Apply hydrofera blue to wound (cut to fit and moisten with saline) Apply skin prep around wound where bordered foam will be placed. Cover with silicone bordered foam. Change 3 times weekly. Keep all dressings clean & dry. Follow up visit:   3 Weeks on Monday 2/13/23 at [address]: Home health to order supplies    Keep next scheduled appointment. Please give 24 hour notice if unable to keep appointment. [phone]    If you experience any of the following, please call the Wound Care Service during business hours: Monday through Friday 8:00 am - 4:30 pm  ([phone]). *Increase in pain   *Temperature over 101   *Increase in drainage from your wound or a foul odor   *Uncontrolled swelling   *Need for compression bandage changes due to slippage, breakthrough drainage    If you need medical attention outside of business hours, please contact your Primary Care Doctor or go to the nearest emergency room.

## 2023-02-06 ENCOUNTER — APPOINTMENT (OUTPATIENT)
Dept: CT IMAGING | Age: 68
DRG: 641 | End: 2023-02-06
Payer: MEDICARE

## 2023-02-06 ENCOUNTER — HOSPITAL ENCOUNTER (INPATIENT)
Age: 68
LOS: 1 days | Discharge: LEFT BEFORE DISCHARGE | DRG: 641 | End: 2023-02-07
Attending: STUDENT IN AN ORGANIZED HEALTH CARE EDUCATION/TRAINING PROGRAM | Admitting: STUDENT IN AN ORGANIZED HEALTH CARE EDUCATION/TRAINING PROGRAM
Payer: MEDICARE

## 2023-02-06 ENCOUNTER — APPOINTMENT (OUTPATIENT)
Dept: GENERAL RADIOLOGY | Age: 68
DRG: 641 | End: 2023-02-06
Payer: MEDICARE

## 2023-02-06 ENCOUNTER — APPOINTMENT (OUTPATIENT)
Dept: MRI IMAGING | Age: 68
DRG: 641 | End: 2023-02-06
Payer: MEDICARE

## 2023-02-06 DIAGNOSIS — J18.9 COMMUNITY ACQUIRED PNEUMONIA, UNSPECIFIED LATERALITY: ICD-10-CM

## 2023-02-06 DIAGNOSIS — R41.82 ALTERED MENTAL STATUS, UNSPECIFIED ALTERED MENTAL STATUS TYPE: Primary | ICD-10-CM

## 2023-02-06 LAB
ALBUMIN SERPL BCG-MCNC: 3.6 G/DL (ref 3.5–5.1)
ALP SERPL-CCNC: 60 U/L (ref 38–126)
ALT SERPL W/O P-5'-P-CCNC: 6 U/L (ref 11–66)
AMMONIA PLAS-MCNC: 21 UMOL/L (ref 11–60)
AMPHETAMINES UR QL SCN: NEGATIVE
ANION GAP SERPL CALC-SCNC: 11 MEQ/L (ref 8–16)
ANION GAP SERPL CALC-SCNC: 8 MEQ/L (ref 8–16)
APAP SERPL-MCNC: 20.2 UG/ML (ref 0–20)
AST SERPL-CCNC: 11 U/L (ref 5–40)
BARBITURATES UR QL SCN: NEGATIVE
BASOPHILS ABSOLUTE: 0.1 THOU/MM3 (ref 0–0.1)
BASOPHILS NFR BLD AUTO: 0.9 %
BENZODIAZ UR QL SCN: NEGATIVE
BILIRUB SERPL-MCNC: < 0.2 MG/DL (ref 0.3–1.2)
BILIRUB UR QL STRIP.AUTO: NEGATIVE
BUN SERPL-MCNC: 25 MG/DL (ref 7–22)
BUN SERPL-MCNC: 28 MG/DL (ref 7–22)
BZE UR QL SCN: NEGATIVE
CALCIUM SERPL-MCNC: 8.5 MG/DL (ref 8.5–10.5)
CALCIUM SERPL-MCNC: 9 MG/DL (ref 8.5–10.5)
CANNABINOIDS UR QL SCN: NEGATIVE
CHARACTER UR: CLEAR
CHLORIDE SERPL-SCNC: 118 MEQ/L (ref 98–111)
CHLORIDE SERPL-SCNC: 120 MEQ/L (ref 98–111)
CO2 SERPL-SCNC: 20 MEQ/L (ref 23–33)
CO2 SERPL-SCNC: 20 MEQ/L (ref 23–33)
COLOR: YELLOW
CREAT SERPL-MCNC: 0.7 MG/DL (ref 0.4–1.2)
CREAT SERPL-MCNC: 0.9 MG/DL (ref 0.4–1.2)
DEPRECATED RDW RBC AUTO: 59.4 FL (ref 35–45)
EOSINOPHIL NFR BLD AUTO: 3.7 %
EOSINOPHILS ABSOLUTE: 0.2 THOU/MM3 (ref 0–0.4)
ERYTHROCYTE [DISTWIDTH] IN BLOOD BY AUTOMATED COUNT: 15.9 % (ref 11.5–14.5)
ETHANOL SERPL-MCNC: < 0.01 %
FENTANYL: NEGATIVE
FLUAV RNA RESP QL NAA+PROBE: NOT DETECTED
FLUBV RNA RESP QL NAA+PROBE: NOT DETECTED
FOLATE SERPL-MCNC: 6.4 NG/ML (ref 4.8–24.2)
GFR SERPL CREATININE-BSD FRML MDRD: > 60 ML/MIN/1.73M2
GFR SERPL CREATININE-BSD FRML MDRD: > 60 ML/MIN/1.73M2
GLUCOSE SERPL-MCNC: 102 MG/DL (ref 70–108)
GLUCOSE SERPL-MCNC: 94 MG/DL (ref 70–108)
GLUCOSE UR QL STRIP.AUTO: NEGATIVE MG/DL
HCT VFR BLD AUTO: 36 % (ref 37–47)
HGB BLD-MCNC: 11.3 GM/DL (ref 12–16)
HGB UR QL STRIP.AUTO: NEGATIVE
IMM GRANULOCYTES # BLD AUTO: 0.04 THOU/MM3 (ref 0–0.07)
IMM GRANULOCYTES NFR BLD AUTO: 0.7 %
KETONES UR QL STRIP.AUTO: ABNORMAL
LACTIC ACID, SEPSIS: 1 MMOL/L (ref 0.5–1.9)
LIPASE SERPL-CCNC: 16.8 U/L (ref 5.6–51.3)
LYMPHOCYTES ABSOLUTE: 2.1 THOU/MM3 (ref 1–4.8)
LYMPHOCYTES NFR BLD AUTO: 36.6 %
MAGNESIUM SERPL-MCNC: 1.9 MG/DL (ref 1.6–2.4)
MCH RBC QN AUTO: 32.5 PG (ref 26–33)
MCHC RBC AUTO-ENTMCNC: 31.4 GM/DL (ref 32.2–35.5)
MCV RBC AUTO: 103.4 FL (ref 81–99)
MONOCYTES ABSOLUTE: 0.6 THOU/MM3 (ref 0.4–1.3)
MONOCYTES NFR BLD AUTO: 11.1 %
NEUTROPHILS NFR BLD AUTO: 47 %
NITRITE UR QL STRIP: NEGATIVE
NRBC BLD AUTO-RTO: 0 /100 WBC
NT-PROBNP SERPL IA-MCNC: 399.4 PG/ML (ref 0–124)
OPIATES UR QL SCN: NEGATIVE
OSMOLALITY SERPL CALC.SUM OF ELEC: 301.8 MOSMOL/KG (ref 275–300)
OXYCODONE: NEGATIVE
PCP UR QL SCN: NEGATIVE
PH UR STRIP.AUTO: 6 [PH] (ref 5–9)
PLATELET # BLD AUTO: 192 THOU/MM3 (ref 130–400)
PMV BLD AUTO: 11.6 FL (ref 9.4–12.4)
POTASSIUM SERPL-SCNC: 3.8 MEQ/L (ref 3.5–5.2)
POTASSIUM SERPL-SCNC: 4.6 MEQ/L (ref 3.5–5.2)
PROCALCITONIN SERPL IA-MCNC: 0.04 NG/ML (ref 0.01–0.09)
PROT SERPL-MCNC: 5.4 G/DL (ref 6.1–8)
PROT UR STRIP.AUTO-MCNC: NEGATIVE MG/DL
RBC # BLD AUTO: 3.48 MILL/MM3 (ref 4.2–5.4)
SALICYLATES SERPL-MCNC: 0.8 MG/DL (ref 2–10)
SARS-COV-2 RNA RESP QL NAA+PROBE: NOT DETECTED
SEGMENTED NEUTROPHILS ABSOLUTE COUNT: 2.7 THOU/MM3 (ref 1.8–7.7)
SODIUM SERPL-SCNC: 148 MEQ/L (ref 135–145)
SODIUM SERPL-SCNC: 149 MEQ/L (ref 135–145)
SP GR UR REFRACT.AUTO: > 1.03 (ref 1–1.03)
TROPONIN T: < 0.01 NG/ML
TSH SERPL DL<=0.005 MIU/L-ACNC: 2.5 UIU/ML (ref 0.4–4.2)
UROBILINOGEN, URINE: 1 EU/DL (ref 0–1)
VIT B12 SERPL-MCNC: 294 PG/ML (ref 211–911)
WBC # BLD AUTO: 5.7 THOU/MM3 (ref 4.8–10.8)
WBC #/AREA URNS HPF: NEGATIVE /[HPF]

## 2023-02-06 PROCEDURE — 92523 SPEECH SOUND LANG COMPREHEN: CPT

## 2023-02-06 PROCEDURE — 1200000003 HC TELEMETRY R&B

## 2023-02-06 PROCEDURE — 99223 1ST HOSP IP/OBS HIGH 75: CPT

## 2023-02-06 PROCEDURE — 82077 ASSAY SPEC XCP UR&BREATH IA: CPT

## 2023-02-06 PROCEDURE — 83605 ASSAY OF LACTIC ACID: CPT

## 2023-02-06 PROCEDURE — 6370000000 HC RX 637 (ALT 250 FOR IP)

## 2023-02-06 PROCEDURE — 83880 ASSAY OF NATRIURETIC PEPTIDE: CPT

## 2023-02-06 PROCEDURE — 2580000003 HC RX 258

## 2023-02-06 PROCEDURE — 87040 BLOOD CULTURE FOR BACTERIA: CPT

## 2023-02-06 PROCEDURE — 83735 ASSAY OF MAGNESIUM: CPT

## 2023-02-06 PROCEDURE — 93005 ELECTROCARDIOGRAM TRACING: CPT | Performed by: STUDENT IN AN ORGANIZED HEALTH CARE EDUCATION/TRAINING PROGRAM

## 2023-02-06 PROCEDURE — 71046 X-RAY EXAM CHEST 2 VIEWS: CPT

## 2023-02-06 PROCEDURE — 96372 THER/PROPH/DIAG INJ SC/IM: CPT

## 2023-02-06 PROCEDURE — 87636 SARSCOV2 & INF A&B AMP PRB: CPT

## 2023-02-06 PROCEDURE — 82607 VITAMIN B-12: CPT

## 2023-02-06 PROCEDURE — 84484 ASSAY OF TROPONIN QUANT: CPT

## 2023-02-06 PROCEDURE — 82140 ASSAY OF AMMONIA: CPT

## 2023-02-06 PROCEDURE — 70450 CT HEAD/BRAIN W/O DYE: CPT

## 2023-02-06 PROCEDURE — 84145 PROCALCITONIN (PCT): CPT

## 2023-02-06 PROCEDURE — 82746 ASSAY OF FOLIC ACID SERUM: CPT

## 2023-02-06 PROCEDURE — 99285 EMERGENCY DEPT VISIT HI MDM: CPT

## 2023-02-06 PROCEDURE — 80307 DRUG TEST PRSMV CHEM ANLYZR: CPT

## 2023-02-06 PROCEDURE — 80143 DRUG ASSAY ACETAMINOPHEN: CPT

## 2023-02-06 PROCEDURE — 84443 ASSAY THYROID STIM HORMONE: CPT

## 2023-02-06 PROCEDURE — 6360000002 HC RX W HCPCS: Performed by: STUDENT IN AN ORGANIZED HEALTH CARE EDUCATION/TRAINING PROGRAM

## 2023-02-06 PROCEDURE — 70553 MRI BRAIN STEM W/O & W/DYE: CPT

## 2023-02-06 PROCEDURE — 71045 X-RAY EXAM CHEST 1 VIEW: CPT

## 2023-02-06 PROCEDURE — 2580000003 HC RX 258: Performed by: STUDENT IN AN ORGANIZED HEALTH CARE EDUCATION/TRAINING PROGRAM

## 2023-02-06 PROCEDURE — 96367 TX/PROPH/DG ADDL SEQ IV INF: CPT

## 2023-02-06 PROCEDURE — A9579 GAD-BASE MR CONTRAST NOS,1ML: HCPCS

## 2023-02-06 PROCEDURE — 80053 COMPREHEN METABOLIC PANEL: CPT

## 2023-02-06 PROCEDURE — 6360000002 HC RX W HCPCS

## 2023-02-06 PROCEDURE — 36415 COLL VENOUS BLD VENIPUNCTURE: CPT

## 2023-02-06 PROCEDURE — 83690 ASSAY OF LIPASE: CPT

## 2023-02-06 PROCEDURE — 85025 COMPLETE CBC W/AUTO DIFF WBC: CPT

## 2023-02-06 PROCEDURE — 81003 URINALYSIS AUTO W/O SCOPE: CPT

## 2023-02-06 PROCEDURE — 80179 DRUG ASSAY SALICYLATE: CPT

## 2023-02-06 PROCEDURE — 96365 THER/PROPH/DIAG IV INF INIT: CPT

## 2023-02-06 PROCEDURE — 97129 THER IVNTJ 1ST 15 MIN: CPT

## 2023-02-06 PROCEDURE — 6360000004 HC RX CONTRAST MEDICATION

## 2023-02-06 RX ORDER — MAGNESIUM SULFATE IN WATER 40 MG/ML
2000 INJECTION, SOLUTION INTRAVENOUS PRN
Status: DISCONTINUED | OUTPATIENT
Start: 2023-02-06 | End: 2023-02-07 | Stop reason: HOSPADM

## 2023-02-06 RX ORDER — ACETAMINOPHEN 325 MG/1
650 TABLET ORAL EVERY 6 HOURS PRN
Status: DISCONTINUED | OUTPATIENT
Start: 2023-02-06 | End: 2023-02-07 | Stop reason: HOSPADM

## 2023-02-06 RX ORDER — SODIUM CHLORIDE 9 MG/ML
INJECTION, SOLUTION INTRAVENOUS PRN
Status: DISCONTINUED | OUTPATIENT
Start: 2023-02-06 | End: 2023-02-07 | Stop reason: HOSPADM

## 2023-02-06 RX ORDER — PROPRANOLOL HYDROCHLORIDE 20 MG/1
20 TABLET ORAL 3 TIMES DAILY
Status: DISCONTINUED | OUTPATIENT
Start: 2023-02-06 | End: 2023-02-06

## 2023-02-06 RX ORDER — QUETIAPINE 300 MG/1
300 TABLET, FILM COATED, EXTENDED RELEASE ORAL NIGHTLY
Status: DISCONTINUED | OUTPATIENT
Start: 2023-02-06 | End: 2023-02-06

## 2023-02-06 RX ORDER — POLYETHYLENE GLYCOL 3350 17 G/17G
17 POWDER, FOR SOLUTION ORAL DAILY PRN
Status: DISCONTINUED | OUTPATIENT
Start: 2023-02-06 | End: 2023-02-07 | Stop reason: HOSPADM

## 2023-02-06 RX ORDER — HALOPERIDOL 5 MG/ML
5 INJECTION INTRAMUSCULAR ONCE
Status: COMPLETED | OUTPATIENT
Start: 2023-02-06 | End: 2023-02-06

## 2023-02-06 RX ORDER — POTASSIUM CHLORIDE 20 MEQ/1
40 TABLET, EXTENDED RELEASE ORAL PRN
Status: DISCONTINUED | OUTPATIENT
Start: 2023-02-06 | End: 2023-02-07 | Stop reason: HOSPADM

## 2023-02-06 RX ORDER — ENOXAPARIN SODIUM 100 MG/ML
40 INJECTION SUBCUTANEOUS DAILY
Status: DISCONTINUED | OUTPATIENT
Start: 2023-02-06 | End: 2023-02-07 | Stop reason: HOSPADM

## 2023-02-06 RX ORDER — SODIUM CHLORIDE 0.9 % (FLUSH) 0.9 %
5-40 SYRINGE (ML) INJECTION PRN
Status: DISCONTINUED | OUTPATIENT
Start: 2023-02-06 | End: 2023-02-07 | Stop reason: HOSPADM

## 2023-02-06 RX ORDER — METOPROLOL TARTRATE 50 MG/1
50 TABLET, FILM COATED ORAL 2 TIMES DAILY
Status: DISCONTINUED | OUTPATIENT
Start: 2023-02-06 | End: 2023-02-07 | Stop reason: HOSPADM

## 2023-02-06 RX ORDER — SODIUM CHLORIDE 0.9 % (FLUSH) 0.9 %
5-40 SYRINGE (ML) INJECTION EVERY 12 HOURS SCHEDULED
Status: DISCONTINUED | OUTPATIENT
Start: 2023-02-06 | End: 2023-02-07 | Stop reason: HOSPADM

## 2023-02-06 RX ORDER — LEVOTHYROXINE SODIUM 0.07 MG/1
75 TABLET ORAL DAILY
Status: DISCONTINUED | OUTPATIENT
Start: 2023-02-06 | End: 2023-02-07 | Stop reason: HOSPADM

## 2023-02-06 RX ORDER — POTASSIUM CHLORIDE 7.45 MG/ML
10 INJECTION INTRAVENOUS PRN
Status: DISCONTINUED | OUTPATIENT
Start: 2023-02-06 | End: 2023-02-07 | Stop reason: HOSPADM

## 2023-02-06 RX ORDER — ONDANSETRON 2 MG/ML
4 INJECTION INTRAMUSCULAR; INTRAVENOUS EVERY 6 HOURS PRN
Status: DISCONTINUED | OUTPATIENT
Start: 2023-02-06 | End: 2023-02-07 | Stop reason: HOSPADM

## 2023-02-06 RX ORDER — 0.9 % SODIUM CHLORIDE 0.9 %
1000 INTRAVENOUS SOLUTION INTRAVENOUS ONCE
Status: COMPLETED | OUTPATIENT
Start: 2023-02-06 | End: 2023-02-06

## 2023-02-06 RX ORDER — ONDANSETRON 4 MG/1
4 TABLET, ORALLY DISINTEGRATING ORAL EVERY 8 HOURS PRN
Status: DISCONTINUED | OUTPATIENT
Start: 2023-02-06 | End: 2023-02-07 | Stop reason: HOSPADM

## 2023-02-06 RX ORDER — BISACODYL 10 MG
10 SUPPOSITORY, RECTAL RECTAL DAILY PRN
Status: DISCONTINUED | OUTPATIENT
Start: 2023-02-06 | End: 2023-02-07 | Stop reason: HOSPADM

## 2023-02-06 RX ORDER — SODIUM CHLORIDE, SODIUM LACTATE, POTASSIUM CHLORIDE, CALCIUM CHLORIDE 600; 310; 30; 20 MG/100ML; MG/100ML; MG/100ML; MG/100ML
INJECTION, SOLUTION INTRAVENOUS CONTINUOUS
Status: DISCONTINUED | OUTPATIENT
Start: 2023-02-06 | End: 2023-02-07

## 2023-02-06 RX ORDER — ACETAMINOPHEN 650 MG/1
650 SUPPOSITORY RECTAL EVERY 6 HOURS PRN
Status: DISCONTINUED | OUTPATIENT
Start: 2023-02-06 | End: 2023-02-07 | Stop reason: HOSPADM

## 2023-02-06 RX ADMIN — METOPROLOL TARTRATE 50 MG: 50 TABLET, FILM COATED ORAL at 12:05

## 2023-02-06 RX ADMIN — CEFTRIAXONE SODIUM 1000 MG: 1 INJECTION, POWDER, FOR SOLUTION INTRAMUSCULAR; INTRAVENOUS at 04:57

## 2023-02-06 RX ADMIN — HALOPERIDOL LACTATE 5 MG: 5 INJECTION, SOLUTION INTRAMUSCULAR at 03:16

## 2023-02-06 RX ADMIN — GADOTERIDOL 20 ML: 279.3 INJECTION, SOLUTION INTRAVENOUS at 13:25

## 2023-02-06 RX ADMIN — SODIUM CHLORIDE 1000 ML: 9 INJECTION, SOLUTION INTRAVENOUS at 04:10

## 2023-02-06 RX ADMIN — SODIUM CHLORIDE, POTASSIUM CHLORIDE, SODIUM LACTATE AND CALCIUM CHLORIDE: 600; 310; 30; 20 INJECTION, SOLUTION INTRAVENOUS at 12:10

## 2023-02-06 RX ADMIN — LEVOTHYROXINE SODIUM 75 MCG: 0.07 TABLET ORAL at 12:06

## 2023-02-06 RX ADMIN — ACETAMINOPHEN 650 MG: 325 TABLET ORAL at 13:56

## 2023-02-06 RX ADMIN — AZITHROMYCIN MONOHYDRATE 500 MG: 500 INJECTION, POWDER, LYOPHILIZED, FOR SOLUTION INTRAVENOUS at 05:32

## 2023-02-06 RX ADMIN — ENOXAPARIN SODIUM 40 MG: 100 INJECTION SUBCUTANEOUS at 12:05

## 2023-02-06 RX ADMIN — METOPROLOL TARTRATE 50 MG: 50 TABLET, FILM COATED ORAL at 20:54

## 2023-02-06 ASSESSMENT — PAIN - FUNCTIONAL ASSESSMENT
PAIN_FUNCTIONAL_ASSESSMENT: NONE - DENIES PAIN
PAIN_FUNCTIONAL_ASSESSMENT: NONE - DENIES PAIN

## 2023-02-06 ASSESSMENT — PAIN DESCRIPTION - LOCATION: LOCATION: GROIN

## 2023-02-06 ASSESSMENT — PAIN DESCRIPTION - ORIENTATION: ORIENTATION: RIGHT;LEFT

## 2023-02-06 ASSESSMENT — PAIN SCALES - GENERAL
PAINLEVEL_OUTOF10: 0
PAINLEVEL_OUTOF10: 0
PAINLEVEL_OUTOF10: 8

## 2023-02-06 ASSESSMENT — PAIN DESCRIPTION - DESCRIPTORS: DESCRIPTORS: ACHING
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
## 2023-02-06 NOTE — ED NOTES
PT resting in bed. PT speaking with inappropriate words. PT alert. Respirations even and unlabored. PT and VS assessed. Bed alarm on.      Hernando Clark RN  02/06/23 0344

## 2023-02-06 NOTE — ED PROVIDER NOTES
[facility] [address] [address] EMERGENCY DEPT      EMERGENCY MEDICINE     Pt Name: Elie Stiles  MRN: [id number]  [place] 1955  Date of evaluation: 2/6/2023  Provider: Reddy Alexander MD    CHIEF COMPLAINT       Chief Complaint   Patient presents with    Altered Mental Status     HISTORY OF PRESENT ILLNESS   Elie Stiles is a pleasant 74 y.o. female who presents to the emergency department for evaluation of altered mental status. Patient's fiancé called EMS due to concern that patient was hallucinating at home and not acting like her normal self. He states that this started around 2-3 o'clock yesterday afternoon intermittently and might have been before that as well. He states that she has never had anything like this before. Patient denies any complaints. Per patient's fiancé, he believes she is taking all of her medications. He does not think she used any drugs or alcohol. Patient unable to provide any further history but denies chest pain, headache, abdominal pain, shortness of breath, lower extremity edema.       PASTMEDICAL HISTORY     Past Medical History:   Diagnosis Date    Anxiety     COPD (chronic obstructive pulmonary disease) ([facility] [address]     Hypertension     Osteoporosis     Thyroid disease        Patient Active Problem List   Diagnosis Code    BETY (generalized anxiety disorder) F41.1    Chronic post-traumatic stress disorder (PTSD) F43.12    Insomnia related to another mental disorder F51.05    Major depressive disorder, single episode, moderate (HCC) F32.1    Insomnia, persistent G47.00    Nonhealing surgical wound T81.89XA    History of arterial bypass of lower extremity Z95.828    Hypertension I10    Hypothyroidism E03.9    Overweight E66.3    Peripheral arterial disease (HCC) I73.9    Altered mental status R41.82     SURGICAL HISTORY       Past Surgical History:   Procedure Laterality Date    HIP SURGERY Left        CURRENT MEDICATIONS       Previous Medications    ESCITALOPRAM (LEXAPRO) 20 MG TABLET    Take 1 tablet by mouth in the morning. LEVOTHYROXINE (SYNTHROID) 75 MCG TABLET    Take 75 mcg by mouth Daily    METOPROLOL TARTRATE (LOPRESSOR) 50 MG TABLET    Take 50 mg by mouth 2 times daily    PROPRANOLOL (INDERAL) 20 MG TABLET    Take 1 tablet by mouth in the morning and 1 tablet at noon and 1 tablet before bedtime. QUETIAPINE (SEROQUEL XR) 300 MG EXTENDED RELEASE TABLET    Take 1 tablet by mouth nightly       ALLERGIES     has No Known Allergies. FAMILY HISTORY     has no family status information on file. SOCIAL HISTORY       Social History     Tobacco Use    Smoking status: Every Day     Packs/day: 0.25     Types: Cigarettes    Smokeless tobacco: Never    Tobacco comments:     Given in new patient packet   Substance Use Topics    Alcohol use: No    Drug use: No       PHYSICAL EXAM       ED Triage Vitals [02/06/23 0205]   BP Temp Temp Source Heart Rate Resp SpO2 Height Weight   (!) 112/98 97.5 °F (36.4 °C) Oral 90 20 97 % -- 185 lb (83.9 kg)       Additional Vital Signs:  Vitals:    02/06/23 0633   BP: (!) 96/57   Pulse: 73   Resp: 20   Temp:    SpO2: 100%     Physical Exam  Vitals and nursing note reviewed. Constitutional:       General: She is not in acute distress. Appearance: Normal appearance. She is ill-appearing. HENT:      Head: Normocephalic and atraumatic. Mouth/Throat:      Mouth: Mucous membranes are moist.   Eyes:      Extraocular Movements: Extraocular movements intact. Conjunctiva/sclera: Conjunctivae normal.      Pupils: Pupils are equal, round, and reactive to light. Cardiovascular:      Rate and Rhythm: Normal rate and regular rhythm. Pulses: Normal pulses. Heart sounds: Normal heart sounds. Pulmonary:      Effort: Pulmonary effort is normal.      Breath sounds: Wheezing and rhonchi present. Abdominal:      General: Abdomen is flat. Palpations: Abdomen is soft. Tenderness: There is no abdominal tenderness.    Musculoskeletal: General: Normal range of motion. Cervical back: Normal range of motion and neck supple. Skin:     General: Skin is warm and dry. Capillary Refill: Capillary refill takes less than 2 seconds. Neurological:      General: No focal deficit present. Comments: Confused, oriented to self but confused about location/time. Not cooperative with full neurologic exam, but moves all 4 extremities equally, denies sensory deficit. Psychiatric:         Mood and Affect: Mood normal.         Behavior: Behavior normal.       FORMAL DIAGNOSTIC RESULTS     RADIOLOGY: Interpretation per the Radiologist below, if available at the time of this note (none if blank):    CT HEAD WO CONTRAST   Final Result   No acute intracranial findings. Chronic changes as described. This document has been electronically signed by: Yariel Brito MD on    02/06/2023 04:07 AM      All CTs at this facility use dose modulation techniques and iterative    reconstructions, and/or weight-based dosing   when appropriate to reduce radiation to a low as reasonably achievable. XR CHEST PORTABLE   Final Result   Impression:   Mild left basilar atelectasis versus infiltrate. This document has been electronically signed by:  Yariel Brito MD on    02/06/2023 02:40 AM          LABS: (none if blank)  Labs Reviewed   CBC WITH AUTO DIFFERENTIAL - Abnormal; Notable for the following components:       Result Value    RBC 3.48 (*)     Hemoglobin 11.3 (*)     Hematocrit 36.0 (*)     .4 (*)     MCHC 31.4 (*)     RDW-CV 15.9 (*)     RDW-SD 59.4 (*)     All other components within normal limits   COMPREHENSIVE METABOLIC PANEL - Abnormal; Notable for the following components:    BUN 28 (*)     Sodium 149 (*)     Chloride 118 (*)     CO2 20 (*)     Total Protein 5.4 (*)     Total Bilirubin <0.2 (*)     ALT 6 (*)     All other components within normal limits   URINALYSIS WITH REFLEX TO CULTURE - Abnormal; Notable for the following components:    Ketones, Urine TRACE (*)     Specific Gravity, Urine > 1.030 (*)     All other components within normal limits   BRAIN NATRIURETIC PEPTIDE - Abnormal; Notable for the following components:    Pro-.4 (*)     All other components within normal limits   ACETAMINOPHEN LEVEL - Abnormal; Notable for the following components:    Acetaminophen Level 20.2 (*)     All other components within normal limits   SALICYLATE LEVEL - Abnormal; Notable for the following components:    Salicylate, Serum 0.8 (*)     All other components within normal limits   OSMOLALITY - Abnormal; Notable for the following components:    Osmolality Calc 301.8 (*)     All other components within normal limits   COVID-19 & INFLUENZA COMBO   CULTURE, BLOOD 2   CULTURE, BLOOD 1   LACTATE, SEPSIS   LIPASE   AMMONIA   URINE DRUG SCREEN   TROPONIN   ETHANOL   ANION GAP   GLOMERULAR FILTRATION RATE, ESTIMATED   VITAMIN B12 & FOLATE   MAGNESIUM   BASIC METABOLIC PANEL       (Any cultures that may have been sent were not resulted at the time of this patient visit)    [facility] / ED COURSE:     1) Number and Complexity of Problems            Problem List This Visit:         Chief Complaint   Patient presents with    Altered Mental Status            Differential Diagnosis includes (but not limited to):  Drug intoxication, infection such as UTI or pneumonia, intracranial hemorrhage or mass, electrolyte abnormality, hyperammonemia, uremia, dehydration             Pertinent Comorbid Conditions:    COPD, PAD, hypertension, anxiety disorder    2)  Data Reviewed (none if left blank)          My Independent interpretations:     EKG:      Normal sinus rhythm with a rate of 77, , QRS 88, QTc 434, normal axis, no ischemic changes    Imaging: CT head negative for acute process.   Chest x-ray shows atelectasis versus infiltrate, given altered mental status, wheezes and rhonchi on physical exam, suspect infiltrate    Labs:      Patient with hypernatremia with associated hyperchloremia, UA negative for infection, urine drug screen negative, ethanol level negative, salicylate and Tylenol level very minimally elevated consistent with regular use of these medications, ammonia within normal limits, lactate normal.  No leukocytosis, normal hemoglobin. COVID and influenza were negative. External Documentation Reviewed:         Previous patient encounter documents & history available on EMR was reviewed: Outpatient wound care notes from January 26             See Formal Diagnostic Results above for the lab and radiology tests and orders. 3)  Treatment and Disposition         ED Reassessment: On reassessment, patient restless, speaking nonsensical phrases and intermittently trying to bite nurse in room. Patient given IM Haldol. At the time of reassessment, patient resting in bed comfortably and denies complaints. Case discussed with consulting clinician: Hospitalist for admission           Code Status: Full      Summary of Patient Presentation:      MDM  Number of Diagnoses or Management Options  Altered mental status, unspecified altered mental status type  Community acquired pneumonia, unspecified laterality  Diagnosis management comments: Patient is a 70-year-old female with a history of COPD, hypertension, PAD, anxiety, PTSD who presents with altered mental status. Patient is hemodynamically stable and in no distress. Patient presented with altered mental status, inappropriate words and behavior without clear etiology. Patient has evidence of infiltrate on chest x-ray for which she was started on IV antibiotics. Patient to be admitted to hospitalist team for altered mental status.   Patient excepted by hospitalist for admission.    /   Allan Ham Reviewed:    Vitals:    02/06/23 0359 02/06/23 0448 02/06/23 0535 02/06/23 0633   BP: 106/67 (!) 101/58 (!) 99/49 (!) 96/57   Pulse: 85 81 74 73   Resp: 20 20 21 20   Temp: TempSrc:       SpO2: 97% 94% 97% 100%   Weight:           The patient was seen and examined. Appropriate diagnostic testing was performed and results reviewed with the patient. ED Medications administered this visit:  (None if blank)  Medications   0.9 % sodium chloride bolus (0 mLs IntraVENous Stopped 2/6/23 0333)   haloperidol lactate (HALDOL) injection 5 mg (5 mg IntraMUSCular Given 2/6/23 2286)   cefTRIAXone (ROCEPHIN) 1,000 mg in sodium chloride 0.9 % 50 mL IVPB (mini-bag) (0 mg IntraVENous Stopped 2/6/23 9332)   azithromycin (ZITHROMAX) 500 mg in sodium chloride 0.9 % 250 mL IVPB ([id number]) (0 mg IntraVENous Stopped 2/6/23 7117)         PROCEDURES: (None if blank)  Procedures:     CRITICAL CARE: (None if blank)      DISCHARGE PRESCRIPTIONS: (None if blank)  New Prescriptions    No medications on file       FINAL IMPRESSION      1. Altered mental status, unspecified altered mental status type    2. Community acquired pneumonia, unspecified laterality          DISPOSITION/PLAN   DISPOSITION Admitted 02/06/2023 06:31:52 AM      OUTPATIENT FOLLOW UP THE PATIENT:  No follow-up provider specified.     MD Marisabel Gates MD  02/06/23 7091

## 2023-02-06 NOTE — ED NOTES
ED to inpatient nurses report    Chief Complaint   Patient presents with    Altered Mental Status      Present to ED from home  LOC: alert to only name  Vital signs   Vitals:    02/06/23 0205 02/06/23 0310 02/06/23 0359 02/06/23 0448   BP: (!) 112/98 (!) 142/92 106/67 (!) 101/58   Pulse: 90 84 85 81   Resp: 20 18 20 20   Temp: 97.5 °F (36.4 °C)      TempSrc: Oral      SpO2: 97% 94% 97% 94%   Weight: 185 lb (83.9 kg)         Oxygen Baseline RA    Current needs required RA Bipap/Cpap No  LDAs:   Peripheral IV 02/06/23 Left Hand (Active)     Mobility: Requires assistance * 2  Pending ED orders: none  Present condition: stable      C-SSRS    Swallow Screening    Preferred Language: [place]     Electronically signed by Tiffanie Valencia, RN on 2/6/2023 at 5:08 AM       Shabbir Bland RN  02/06/23 7549

## 2023-02-06 NOTE — ED NOTES
ED to inpatient nurses report    Chief Complaint   Patient presents with    Altered Mental Status      Present to ED from home  LOC: alert and orientated to name and place  Vital signs   Vitals:    02/06/23 0448 02/06/23 0535 02/06/23 0633 02/06/23 0707   BP: (!) 101/58 (!) 99/49 (!) 96/57 96/61   Pulse: 81 74 73 79   Resp: 20 21 20 15   Temp:       TempSrc:       SpO2: 94% 97% 100% 92%   Weight:          Oxygen Baseline room air at this time, had trialed O2 while sleeping earlier this morning d/t O2 dropping to 88%    Current needs required none at this time  LDAs:   Peripheral IV 02/06/23 Left Hand (Active)   Site Assessment Clean, dry & intact 02/06/23 0707   Phlebitis Assessment No symptoms 02/06/23 0707   Infiltration Assessment 0 02/06/23 0707       Peripheral IV 02/06/23 Right Antecubital (Active)   Site Assessment Clean, dry & intact 02/06/23 0707   Line Status Normal saline locked 02/06/23 0707   Phlebitis Assessment No symptoms 02/06/23 0707   Infiltration Assessment 0 02/06/23 0707     Mobility: Requires assistance * 1  Pending ED orders: none  Present condition: stable      C-SSRS    Swallow Screening    Preferred Language: [place]     Electronically signed by Jessica Vora RN on 2/6/2023 at 7:16 AM       Jessica Vora RN  02/06/23 9921

## 2023-02-06 NOTE — ED NOTES
Gathered story from Dr. Ayesha Velasquez about patient's cc saying that kate called EMS last night d/t the patient's confusion and hallucination that she had yesterday and into the night that she has never experienced before. Dr. Ayesha Velasquez states that upon arrival the patient was also trying to bite staff while placing IV lines and was given haldol. Ever since haldol was given, the patient has been calm and cooperative. Pt is calm and cooperative at this time, she has eyes closed in bed and posey alarm is in place.       Sumit Gustafson, RN  02/06/23 1709

## 2023-02-06 NOTE — ED NOTES
PT resting in bed. PT and VS assessed. PT medicated per MAR. Respirations even and unlabored.       Danielle Mohamud RN  02/06/23 5056

## 2023-02-06 NOTE — ED NOTES
Patient resting in bed with eyes closed, respirations are even and unlabored. Patient's oxygen saturation at 88% on room air while sleeping. Placed on 2L O2 via nasal cannula and oxygen saturation now 97%. Call light in reach.       Faina Gaspar RN  02/06/23 0391

## 2023-02-06 NOTE — H&P
Hospitalist History & Physical    Patient:  Connie Jose    Unit/Bed:04/004A  YOB: 1955  MRN: [id number]   Acct: [de-identified]   PCP: EDD Huetra CNP  Code Status: No Order    Date of Service: Pt seen/examined on 02/06/23 and admitted to Inpatient with expected LOS greater than two midnights due to medical therapy. Chief Complaint: altered mentation    Assessment/Plan:    Acute Encephalopathy: etiology unknown. Per ER report, pts kate reports new onset of hallucinations and delirium yesterday afternoon, reported no hx of this occurring before. Pt tried to bite a nurse on arrival to ER. Received haldol in the ED. No hallucinations/agitation during my exam. No focal deficits, possible new right sided facial droop. Per ER provider, kate reported the patients last known well was 02/05 midmorning to afternoon. UA negative for UTI, cannabinoid, alcohol, and PCP negative, CT head negative, ammonia negative, COVID and influenza negative, troponin negative. CXR shows possible infiltrate verse atelectasis- ceftriaxone and azithromycin given x1 in the ER. MRI brain pending. Blood culture x2 pending. Repeat CXR pending given poor quality of first CXR- no leukocytosis or signs of infection, opt not to continue ABX at this time. SLP for cognitive evaluation. If MRI negative, could consider psych consult for acute psychosis. Hypernatremia, chronicity unknown: 149 on admission. Repeat BMP shows 148. Urine specific gravity elevated, suspect dehydration. Continue IVF at 75 ml/hr. Continue to monitor. Macrocytic Anemia: Hgb 11.3 on admission. B12 and folate pending. No acute signs of bleeding. monitor H&H with CBC in the am. Transfuse for Hgb less than 7 or hemodynamic instability if proper consent is obtained. Hypothyroidism: Continue home synthroid. TSH w/ reflex pending.      Essential HTN: continue home medications and monitor    B/l nonhealing surgical groin wounds: patient underwent femoral-femoral bypass with Dr. Iza Urena. At follow up she was found to have infection and superficial fatty necrosis at incision lines. She was taken to OR for I&D pulse lavage 1/5/23 per Dr. Kellie Headley. Currently following with the wound care center, f/u appt 2/13/23. No active signs of infection. Continue to monitor. Psychiatric Hx: MDD, BETY, Chronic PTSD, and insomnia. Previously on propanolol and Seroquel for tx per chart review. Patient reports she is still taking. Per psychiatric notes, she has not had hallucinations in the past. She has reported agitation, decreased concentration, dysphoric mood, sleep disturbances, and suicidal ideation. History of Present Illness:  June Gipson is a 74 y.o. female with PMHx of MDD, hypothyroidism, HTN, and  who presented to [facility] with chief complaint of altered mentation. Patient was alone at bedside during the time of interview. She was alert and oriented to time, person, not oriented to place or situation. The patient was unaware as to why she was in the ER and states she somewhat remembers coming to the ER, though she doesn't know why. Reports remembering what she did yesterday, but does not remember having hallucinations. States she has no current symptoms and feels okay. Denies shortness of breath, CP, headaches, lightheadedness, abdominal pain, and urinary symptoms. Per ER provider, the patient's fiance called EMS yesterday evening because the patient was having hallucinations and acting strange. The patient was agitated on arrival and not cooperative with staff. Reported pt attempted to bite a nurse. The patient was given haldol at this point. Review of Systems: Pertinent positives as noted in the HPI. All other systems reviewed and negative.     Past Medical History:        Diagnosis Date    Anxiety     COPD (chronic obstructive pulmonary disease) ([facility] [address]     Hypertension     Osteoporosis     Thyroid disease        Past Surgical History: Procedure Laterality Date    HIP SURGERY Left        Home Medications:   No current facility-administered medications on file prior to encounter. Current Outpatient Medications on File Prior to Encounter   Medication Sig Dispense Refill    QUEtiapine (SEROQUEL XR) 300 MG extended release tablet Take 1 tablet by mouth nightly 30 tablet 0    escitalopram (LEXAPRO) 20 MG tablet Take 1 tablet by mouth in the morning. (Patient not taking: Reported on 1/26/2023) 30 tablet 1    propranolol (INDERAL) 20 MG tablet Take 1 tablet by mouth in the morning and 1 tablet at noon and 1 tablet before bedtime. 90 tablet 0    metoprolol tartrate (LOPRESSOR) 50 MG tablet Take 50 mg by mouth 2 times daily      levothyroxine (SYNTHROID) 75 MCG tablet Take 75 mcg by mouth Daily         Allergies:    Patient has no known allergies. Social History:    reports that she has been smoking cigarettes. She has been smoking an average of .25 packs per day. She has never used smokeless tobacco. She reports that she does not drink alcohol and does not use drugs. Family History:   History reviewed. No pertinent family history. Diet:  No diet orders on file      Physical Exam:  BP (!) 96/57   Pulse 73   Temp 97.5 °F (36.4 °C) (Oral)   Resp 20   Wt 185 lb (83.9 kg)   SpO2 100%   BMI 32.77 kg/m²   General:   Pleasant female resting in bed, no apparent distress. HEENT:  normocephalic and atraumatic. No scleral icterus. PERR. Neck: supple. Trachea midline  Lungs: Wheezing to auscultation in bilateral lung fields. No retractions. No accessory muscle use  Cardiac: RRR without murmur. Abdomen: soft. Nontender. Bowel sounds positive. Extremities:  No clubbing, cyanosis, or edema x 4. Vasculature: capillary refill < 3 seconds. Palpable LE pulses bilaterally. Skin:  warm and dry. No rashes or lesions. Psych:  Alert and oriented to person and time, disoriented to situation and place.  Patient appeared confused and could not give a clear hx. Neurologic:  Right sided facial droop. Equal strength and sensation in all 4 extremities. Sensation intact in all 4 extremities. No seizures. Data: (All radiographs, tracings, PFTs, and imaging are personally viewed and interpreted unless otherwise noted)  Labs:   Recent Labs     02/06/23 0214   WBC 5.7   HGB 11.3*   HCT 36.0*        Recent Labs     02/06/23 0214   *   K 4.6   *   CO2 20*   BUN 28*   CREATININE 0.9   CALCIUM 9.0     Recent Labs     02/06/23 0214   AST 11   ALT 6*   BILITOT <0.2*   ALKPHOS 60     No results for input(s): INR in the last 72 hours. No results for input(s): [employer] in the last 72 hours. Urinalysis:   Lab Results   Component Value Date/Time    NITRU NEGATIVE 02/06/2023 02:39 AM    BLOODU NEGATIVE 02/06/2023 02:39 AM    GLUCOSEU NEGATIVE 02/06/2023 02:39 AM       EKG: normal sinus rhythm    Radiology:  CT HEAD WO CONTRAST   Final Result   No acute intracranial findings. Chronic changes as described. This document has been electronically signed by: Torres Vincent MD on    02/06/2023 04:07 AM      All CTs at this facility use dose modulation techniques and iterative    reconstructions, and/or weight-based dosing   when appropriate to reduce radiation to a low as reasonably achievable. XR CHEST PORTABLE   Final Result   Impression:   Mild left basilar atelectasis versus infiltrate. This document has been electronically signed by: Torres Vincent MD on    02/06/2023 02:40 AM        CT HEAD WO CONTRAST    Result Date: 2/6/2023  CT head without contrast Comparison: None Findings: No intracranial mass, midline shift, hydrocephalus, or acute hemorrhage. Chronic parenchymal volume loss. Nonspecific scattered white matter changes, most likely reflective of chronic small vessel ischemic changes in patient of this age. No mastoid effusion. No air fluid levels in paranasal sinuses. No acute fracture.      No acute intracranial findings. Chronic changes as described. This document has been electronically signed by: Irene Nichols MD on 02/06/2023 04:07 AM All CTs at this facility use dose modulation techniques and iterative reconstructions, and/or weight-based dosing when appropriate to reduce radiation to a low as reasonably achievable. XR CHEST PORTABLE    Result Date: 2/6/2023  Exam: 1V chest Comparison: None Findings: Mediastinum: No cardiac silhouette enlargement. Lungs: Mild left basilar atelectasis versus infiltrate. Right lung clear. Pleura: No pneumothorax or significant effusion. Bones: No acute pathology. Impression: Mild left basilar atelectasis versus infiltrate. This document has been electronically signed by: Irene Nichols MD on 02/06/2023 02:40 AM        Tele:   [x] yes             [] no      Thank you EDD Horta CNP for the opportunity to be involved in this patient's care.     Electronically signed by Sean Gunderson PA-C on 2/6/2023 at 6:36 AM

## 2023-02-06 NOTE — ED NOTES
PT yelling at RN and trying to jump out of bed. This RN and Jose Lake RN and Sirena Napier RN assisted patient back to bed. In bed with bed alarm on.  PT and VS assessed     Shabbir Bland RN  02/06/23 7767

## 2023-02-06 NOTE — ED NOTES
Pt transported to  6. Pt in stable condition. Floor contacted before transport spoke to miguel angel.       Keo Cuevas  02/06/23 4701

## 2023-02-06 NOTE — ED NOTES
Assumed pt care from [facility], [address] [place] [address]. Pt is sleeping in bed at this time with respers regular and unlabored. Pt has posey alarm in place and will continue to monitor. Call light is within reach.       Jessica Vora, NIRAJ  02/06/23 0895

## 2023-02-07 VITALS
TEMPERATURE: 97.8 F | WEIGHT: 193 LBS | HEIGHT: 63 IN | RESPIRATION RATE: 18 BRPM | BODY MASS INDEX: 34.2 KG/M2 | HEART RATE: 74 BPM | DIASTOLIC BLOOD PRESSURE: 63 MMHG | SYSTOLIC BLOOD PRESSURE: 133 MMHG | OXYGEN SATURATION: 93 %

## 2023-02-07 LAB
ANION GAP SERPL CALC-SCNC: 10 MEQ/L (ref 8–16)
BASOPHILS ABSOLUTE: 0 THOU/MM3 (ref 0–0.1)
BASOPHILS NFR BLD AUTO: 0.6 %
BUN SERPL-MCNC: 22 MG/DL (ref 7–22)
CALCIUM SERPL-MCNC: 8.4 MG/DL (ref 8.5–10.5)
CHLORIDE SERPL-SCNC: 112 MEQ/L (ref 98–111)
CO2 SERPL-SCNC: 20 MEQ/L (ref 23–33)
CREAT SERPL-MCNC: 0.8 MG/DL (ref 0.4–1.2)
CRP SERPL-MCNC: 1.07 MG/DL (ref 0–1)
DEPRECATED RDW RBC AUTO: 59 FL (ref 35–45)
EKG ATRIAL RATE: 77 BPM
EKG P AXIS: 65 DEGREES
EKG P-R INTERVAL: 146 MS
EKG Q-T INTERVAL: 384 MS
EKG QRS DURATION: 88 MS
EKG QTC CALCULATION (BAZETT): 434 MS
EKG R AXIS: 67 DEGREES
EKG T AXIS: 17 DEGREES
EKG VENTRICULAR RATE: 77 BPM
EOSINOPHIL NFR BLD AUTO: 3.4 %
EOSINOPHILS ABSOLUTE: 0.2 THOU/MM3 (ref 0–0.4)
ERYTHROCYTE [DISTWIDTH] IN BLOOD BY AUTOMATED COUNT: 15.8 % (ref 11.5–14.5)
ERYTHROCYTE [SEDIMENTATION RATE] IN BLOOD BY WESTERGREN METHOD: 19 MM/HR (ref 0–20)
GFR SERPL CREATININE-BSD FRML MDRD: > 60 ML/MIN/1.73M2
GLUCOSE SERPL-MCNC: 85 MG/DL (ref 70–108)
HCT VFR BLD AUTO: 34.7 % (ref 37–47)
HGB BLD-MCNC: 10.8 GM/DL (ref 12–16)
IMM GRANULOCYTES # BLD AUTO: 0.04 THOU/MM3 (ref 0–0.07)
IMM GRANULOCYTES NFR BLD AUTO: 0.6 %
LYMPHOCYTES ABSOLUTE: 1.8 THOU/MM3 (ref 1–4.8)
LYMPHOCYTES NFR BLD AUTO: 26.8 %
MCH RBC QN AUTO: 32 PG (ref 26–33)
MCHC RBC AUTO-ENTMCNC: 31.1 GM/DL (ref 32.2–35.5)
MCV RBC AUTO: 103 FL (ref 81–99)
MONOCYTES ABSOLUTE: 0.6 THOU/MM3 (ref 0.4–1.3)
MONOCYTES NFR BLD AUTO: 8.2 %
NEUTROPHILS NFR BLD AUTO: 60.4 %
NRBC BLD AUTO-RTO: 0 /100 WBC
PLATELET # BLD AUTO: 184 THOU/MM3 (ref 130–400)
PMV BLD AUTO: 11.3 FL (ref 9.4–12.4)
POTASSIUM SERPL-SCNC: 4 MEQ/L (ref 3.5–5.2)
RBC # BLD AUTO: 3.37 MILL/MM3 (ref 4.2–5.4)
SEGMENTED NEUTROPHILS ABSOLUTE COUNT: 4.1 THOU/MM3 (ref 1.8–7.7)
SODIUM SERPL-SCNC: 142 MEQ/L (ref 135–145)
WBC # BLD AUTO: 6.8 THOU/MM3 (ref 4.8–10.8)

## 2023-02-07 PROCEDURE — 93010 ELECTROCARDIOGRAM REPORT: CPT | Performed by: INTERNAL MEDICINE

## 2023-02-07 PROCEDURE — 2580000003 HC RX 258

## 2023-02-07 PROCEDURE — 97530 THERAPEUTIC ACTIVITIES: CPT

## 2023-02-07 PROCEDURE — 80048 BASIC METABOLIC PNL TOTAL CA: CPT

## 2023-02-07 PROCEDURE — 6360000002 HC RX W HCPCS

## 2023-02-07 PROCEDURE — 86140 C-REACTIVE PROTEIN: CPT

## 2023-02-07 PROCEDURE — 6370000000 HC RX 637 (ALT 250 FOR IP)

## 2023-02-07 PROCEDURE — 97165 OT EVAL LOW COMPLEX 30 MIN: CPT

## 2023-02-07 PROCEDURE — 97535 SELF CARE MNGMENT TRAINING: CPT

## 2023-02-07 PROCEDURE — 85651 RBC SED RATE NONAUTOMATED: CPT

## 2023-02-07 PROCEDURE — 85025 COMPLETE CBC W/AUTO DIFF WBC: CPT

## 2023-02-07 PROCEDURE — 36415 COLL VENOUS BLD VENIPUNCTURE: CPT

## 2023-02-07 RX ADMIN — SODIUM CHLORIDE, POTASSIUM CHLORIDE, SODIUM LACTATE AND CALCIUM CHLORIDE: 600; 310; 30; 20 INJECTION, SOLUTION INTRAVENOUS at 01:45

## 2023-02-07 RX ADMIN — ENOXAPARIN SODIUM 40 MG: 100 INJECTION SUBCUTANEOUS at 07:57

## 2023-02-07 RX ADMIN — SODIUM CHLORIDE, PRESERVATIVE FREE 10 ML: 5 INJECTION INTRAVENOUS at 07:58

## 2023-02-07 RX ADMIN — LEVOTHYROXINE SODIUM 75 MCG: 0.07 TABLET ORAL at 06:14

## 2023-02-07 RX ADMIN — ACETAMINOPHEN 650 MG: 325 TABLET ORAL at 07:57

## 2023-02-07 RX ADMIN — METOPROLOL TARTRATE 50 MG: 50 TABLET, FILM COATED ORAL at 07:57

## 2023-02-07 ASSESSMENT — PAIN DESCRIPTION - DESCRIPTORS: DESCRIPTORS: ACHING

## 2023-02-07 ASSESSMENT — PAIN DESCRIPTION - FREQUENCY: FREQUENCY: CONTINUOUS

## 2023-02-07 ASSESSMENT — PAIN DESCRIPTION - PAIN TYPE: TYPE: CHRONIC PAIN

## 2023-02-07 ASSESSMENT — PAIN - FUNCTIONAL ASSESSMENT: PAIN_FUNCTIONAL_ASSESSMENT: ACTIVITIES ARE NOT PREVENTED

## 2023-02-07 ASSESSMENT — PAIN DESCRIPTION - ONSET: ONSET: ON-GOING

## 2023-02-07 ASSESSMENT — PAIN DESCRIPTION - ORIENTATION: ORIENTATION: RIGHT;LEFT

## 2023-02-07 ASSESSMENT — PAIN SCALES - GENERAL: PAINLEVEL_OUTOF10: 7

## 2023-02-07 ASSESSMENT — PAIN DESCRIPTION - LOCATION: LOCATION: GROIN

## 2023-02-07 NOTE — DISCHARGE SUMMARY
Patient was educated about MRI abnormality results. She is aware she should be seen by psychiatry and neurology prior to discharge. Patient is deciding to leave AMA. She is alert and oriented x4 and aware of the risks. She states she will f/u with her PCP.      Electronically signed by Joaquin Ring PA-C on 2/7/2023 at 1:52 PM

## 2023-02-07 NOTE — PLAN OF CARE
Problem: Safety - Adult  Goal: Free from fall injury  2/7/2023 0955 by Yuliana Moya RN  Outcome: Progressing  Flowsheets (Taken 2/7/2023 0955)  Free From Fall Injury: Susana Hickman family/caregiver on patient safety     Problem: Pain  Goal: Verbalizes/displays adequate comfort level or baseline comfort level  2/7/2023 0955 by Yuliana Moya RN  Outcome: Progressing  Flowsheets (Taken 2/7/2023 0745)  Verbalizes/displays adequate comfort level or baseline comfort level:   Encourage patient to monitor pain and request assistance   Assess pain using appropriate pain scale   Implement non-pharmacological measures as appropriate and evaluate response   Administer analgesics based on type and severity of pain and evaluate response     Problem: Skin/Tissue Integrity  Goal: Absence of new skin breakdown  Description: 1. Monitor for areas of redness and/or skin breakdown  2. Assess vascular access sites hourly  3. Every 4-6 hours minimum:  Change oxygen saturation probe site  4. Every 4-6 hours:  If on nasal continuous positive airway pressure, respiratory therapy assess nares and determine need for appliance change or resting period. 2/7/2023 0955 by Yuliana Moya RN  Outcome: Progressing  Note: Pt.  Remains free from new skin breakdown     Problem: Discharge Planning  Goal: Discharge to home or other facility with appropriate resources  2/7/2023 0955 by Yuliana Moya RN  Outcome: Progressing  Flowsheets (Taken 2/7/2023 0249)  Discharge to home or other facility with appropriate resources:   Identify barriers to discharge with patient and caregiver   Arrange for needed discharge resources and transportation as appropriate   Identify discharge learning needs (meds, wound care, etc)   Arrange for interpreters to assist at discharge as needed     Problem: Neurosensory - Adult  Goal: Achieves stable or improved neurological status  Outcome: Progressing  Flowsheets (Taken 2/7/2023 0955)  Achieves stable or improved neurological status: Assess for and report changes in neurological status     Problem: Skin/Tissue Integrity - Adult  Goal: Skin integrity remains intact  Outcome: Progressing  Flowsheets (Taken 2/7/2023 0955)  Skin Integrity Remains Intact: Monitor for areas of redness and/or skin breakdown     Problem: Skin/Tissue Integrity - Adult  Goal: Incisions, wounds, or drain sites healing without S/S of infection  Outcome: Progressing  Flowsheets (Taken 2/7/2023 0955)  Incisions, Wounds, or Drain Sites Healing Without Sign and Symptoms of Infection: ADMISSION and DAILY: Assess and document risk factors for pressure ulcer development   Care plan reviewed with patient. Patient verbalizes understanding of the plan of care and contribute to goal setting.

## 2023-02-07 NOTE — CARE COORDINATION
2/7/23, 1:12 PM EST    DISCHARGE PLANNING EVALUATION    Attempted to see patient this afternoon to complete SW consult for \"current with [facility]\". Per RN note, patient left AMA earlier today. SW did leave a message with [facility] to make them aware that patient was here and then left AMA.

## 2023-02-07 NOTE — PROGRESS NOTES
Followed up with Elizabeth Ashley on 2/7/2023 at 12:54 PM. Patient left 5K with a disposition of AMA. Patient stated she did not want to stay any longer and would follow up outpatient as her reason. Cindy Odonnell PA-C aware of patient leaving AMA. Paperwork signed and AVS given to the patient. IV access removed. This RN Advised patient to follow up with a primary care physician or return to the Emergency Department if symptoms worsen.    Elder Vasquez RN

## 2023-02-07 NOTE — PROGRESS NOTES
[address]  INPATIENT OCCUPATIONAL THERAPY  [facility] ONC MED 5K  EVALUATION    Time:   Time In: 4286  Time Out: 6443  Timed Code Treatment Minutes: 23 Minutes  Minutes: 32          Date: 2023  Patient Name: Jaskaran Lazo,   Gender: female      MRN: [id number]  : 1955  (74 y.o.)  Referring Practitioner: Ricardo Belcher PA-C  Diagnosis: AMS  Additional Pertinent Hx: per chart review; Jaskaran Lazo is a pleasant 74 y.o. female who presents to the emergency department for evaluation of altered mental status. Patient's fiancé called EMS due to concern that patient was hallucinating at home and not acting like her normal self. He states that this started around 2-3 o'clock yesterday afternoon intermittently and might have been before that as well. He states that she has never had anything like this before. Patient denies any complaints. Per patient's fiancé, he believes she is taking all of her medications. He does not think she used any drugs or alcohol. Patient unable to provide any further history but denies chest pain, headache, abdominal pain, shortness of breath, lower extremity edema    Restrictions/Precautions:  Restrictions/Precautions: Fall Risk, General Precautions    Subjective  Chart Reviewed: Yes, Orders, Progress Notes, History and Physical  Patient assessed for rehabilitation services?: Yes  Family / Caregiver Present: Yes (significant other)    Subjective: RN approved session, patient supine in bed upon OT arrival and agreeable to session. patient A & O x 4. patient's significant other present in room.     Pain: 0/10:     Vitals: Vitals not assessed per clinical judgement, see nursing flowsheet    Social/Functional History:  Lives With: Significant other  Type of Home: Trailer  Home Layout: One level  Home Access: Stairs to enter with rails  Entrance Stairs - Number of Steps: 4  Home Equipment: Walker, rolling   Bathroom Shower/Tub: Tub/Shower unit (sponge baths only due to wounds)  Bathroom Toilet: Standard  Bathroom Equipment: Grab bars in shower  Bathroom Accessibility: Accessible       ADL Assistance: Independent  Homemaking Assistance: Independent  Ambulation Assistance: Independent  Transfer Assistance: Independent    Active : No  Patient's  Info: patient's significant other  Occupation: On disability  Additional Comments: patient's significant other is home 24/7. patient used no AD prior to admit. VISION:WFL    HEARING:  WFL    COGNITION: Decreased Insight, Decreased Problem Solving, Decreased Safety Awareness, and Impulsive    RANGE OF MOTION:  Bilateral Upper Extremity:  WFL    STRENGTH:  Bilateral Upper Extremity:  shldr 4/5 elbow flex 4+/5 ext 4/5  (F)    SENSATION:   WFL    ADL:   Grooming: Stand By Assistance. To stand at sink to wash hands with cues for 2 w/w placement  Footwear Management: Stand By Assistance. To change socks to slipper socks  Toileting: Stand By Assistance. For clothing mgmt, (I) for toilet hygiene  Toilet Transfer: Stand By Assistance and [employer]. With use of 2 w/w for support . BALANCE:  Sitting Balance:  Supervision. Standing Balance: Stand By Assistance, [employer]. With and without AD    BED MOBILITY:  Supine to Sit: Modified Independent with use of bed rails    TRANSFERS:  Sit to Stand:  Stand By Assistance. Cues for hand placement    FUNCTIONAL MOBILITY:  Assistive Device: Rolling Walker  Assist Level:  Contact Guard Assistance. Distance: To and from bathroom  Patient sat EOB for rest break after coming back from BR and then requested to sit in recliner. Patient functionally ambulated from EOB, around bed and to recliner with no AD and SBA and no LOB. 2 w/w appeared to be more of a hindrance than an assist when going to/from BR. Activity Tolerance:  Patient tolerance of  treatment: good.        Assessment:  Assessment: patient demo overall de-conditioning and weakness with variable cognition impacting her ability to effectively complete ADLs and functional transfers as prior. patient would benefit from continued, skilled OT to progress toward PLOF and safely transition to prior living environment. Performance deficits / Impairments: Decreased functional mobility , Decreased ADL status, Decreased endurance, Decreased strength, Decreased balance, Decreased safe awareness, Decreased cognition  Prognosis: Good  REQUIRES OT FOLLOW-UP: Yes  Decision Making: Low Complexity    Treatment Initiated: Treatment and education initiated within context of evaluation. Evaluation time included review of current medical information, gathering information related to past medical, social and functional history, completion of standardized testing, formal and informal observation of tasks, assessment of data and development of plan of care and goals. Treatment time included skilled education and facilitation of tasks to increase safety and independence with ADL's for improved functional independence and quality of life. Discharge Recommendations:  Continue to assess pending progress, Patient would benefit from continued therapy after discharge    Patient Education:     Patient Education  Education Given To: Patient  Education Provided: Role of Therapy, Transfer Training, Plan of Care, Fall Prevention Strategies, Precautions, ADL Adaptive Strategies  Education Provided Comments: importance of increasing activity  Barriers to Learning: Cognition    Equipment Recommendations:  Equipment Needed: No    Plan:  Times Per Week: 3-5x  Times Per Day: Once a day  Current Treatment Recommendations: Strengthening, Balance training, Functional mobility training, Endurance training, Safety education & training, Neuromuscular re-education, Patient/Caregiver education & training, Self-Care / ADL. See long-term goal time frame for expected duration of plan of care.   If no long-term goals established, a short length of stay is anticipated. Goals:  Patient goals : return home at [facility]  Short Term Goals  Time Frame for Short Term Goals: by discharge  Short Term Goal 1: patient will tolerate 6 min functional standing with two hand release with (S) to increase activity tolerance for grooming and toileting. Short Term Goal 2: patient will functionally ambulate house hold distances with (S). Short Term Goal 3: patient will complete ADL routine with MOD I. Short Term Goal 4: patient will participate in moderate resistive UB exer to increase UB strength for functional transfers. Following session, patient left in safe position with all fall risk precautions in place.

## 2023-02-07 NOTE — PLAN OF CARE
Problem: Safety - Adult  Goal: Free from fall injury  Outcome: Progressing   Care plan reviewed with patient. Patient verbalizes understanding of the plan of care and contributes to goal setting.

## 2023-02-11 LAB
BACTERIA BLD AEROBE CULT: NORMAL
BACTERIA BLD AEROBE CULT: NORMAL

## 2023-02-13 ENCOUNTER — HOSPITAL ENCOUNTER (OUTPATIENT)
Dept: WOUND CARE | Age: 68
Discharge: HOME | End: 2023-02-13
Payer: MEDICARE

## 2023-02-13 VITALS
HEART RATE: 81 BPM | TEMPERATURE: 97.2 F | SYSTOLIC BLOOD PRESSURE: 184 MMHG | RESPIRATION RATE: 18 BRPM | OXYGEN SATURATION: 96 % | DIASTOLIC BLOOD PRESSURE: 80 MMHG

## 2023-02-13 PROCEDURE — 17250 CHEM CAUT OF GRANLTJ TISSUE: CPT | Performed by: NURSE PRACTITIONER

## 2023-02-13 PROCEDURE — 6370000000 HC RX 637 (ALT 250 FOR IP): Performed by: NURSE PRACTITIONER

## 2023-02-13 PROCEDURE — 99213 OFFICE O/P EST LOW 20 MIN: CPT

## 2023-02-13 PROCEDURE — 17250 CHEM CAUT OF GRANLTJ TISSUE: CPT

## 2023-02-13 RX ORDER — SIMVASTATIN 40 MG
40 TABLET ORAL NIGHTLY
COMMUNITY

## 2023-02-13 RX ORDER — ASPIRIN 81 MG/1
81 TABLET ORAL DAILY
COMMUNITY

## 2023-02-13 RX ADMIN — SILVER NITRATE APPLICATORS 1 EACH: 25; 75 STICK TOPICAL at 15:17

## 2023-02-13 ASSESSMENT — PAIN DESCRIPTION - DESCRIPTORS: DESCRIPTORS: BURNING

## 2023-02-13 ASSESSMENT — PAIN DESCRIPTION - LOCATION: LOCATION: GROIN

## 2023-02-13 ASSESSMENT — PAIN DESCRIPTION - ORIENTATION: ORIENTATION: RIGHT;LEFT

## 2023-02-13 NOTE — PROGRESS NOTES
[facility]  Consult and Procedure Note      [address] RECORD NUMBER:  [id number]  AGE: 74 y.o. GENDER: female  : 1955  EPISODE DATE:  2023    SUBJECTIVE:     Chief Complaint   Patient presents with    Wound Check      HISTORY OF PRESENT ILLNESS      Fransico Andres is a 74 y.o. female who presents today for evaluation of bilateral groin wounds. Patient recently underwent  femoral-femoral bypass surgery with Dr. Reina Caba. At follow up she was found to have infection and superficial fatty necrosis at incision lines bilaterally. She was then taken to OR for I&D pulse lavage 23 per Dr. Reina Caba. Wound vac was placed at that time. At initial visit she noted extreme pain related to wound vac. This was discontinued, hydroferra blue secured with silicone bordered foam was ordered alternatively. She notes significant improvement of pain since stopping vac. She denies any concerns with wound care as ordered. She is a current smoker, states that she has been actively working on quitting, is down to 4 cigarettes per day. She denies history of DM. She denies any fevers or chills. Denies any further needs or concerns. PAST MEDICAL HISTORY             Diagnosis Date    Anxiety     COPD (chronic obstructive pulmonary disease) ([address]     Hypertension     Osteoporosis     Thyroid disease      PAST SURGICAL HISTORY     Past Surgical History:   Procedure Laterality Date    HIP SURGERY Left      FAMILY HISTORY     History reviewed. No pertinent family history.     SOCIAL HISTORY     Social History     Tobacco Use    Smoking status: Every Day     Packs/day: 0.25     Types: Cigarettes    Smokeless tobacco: Never    Tobacco comments:     Given in new patient packet   Substance Use Topics    Alcohol use: No    Drug use: No     ALLERGIES     No Known Allergies    MEDICATIONS     Current Outpatient Medications on File Prior to Encounter   Medication Sig Dispense Refill    simvastatin ([place]) 40 MG tablet Take 40 mg by mouth nightly      aspirin 81 MG EC tablet Take 81 mg by mouth daily      QUEtiapine (SEROQUEL XR) 300 MG extended release tablet Take 1 tablet by mouth nightly 30 tablet 0    escitalopram (LEXAPRO) 20 MG tablet Take 1 tablet by mouth in the morning. (Patient not taking: No sig reported) 30 tablet 1    propranolol (INDERAL) 20 MG tablet Take 1 tablet by mouth in the morning and 1 tablet at noon and 1 tablet before bedtime. (Patient not taking: No sig reported) 90 tablet 0    metoprolol tartrate (LOPRESSOR) 50 MG tablet Take 50 mg by mouth 2 times daily      levothyroxine (SYNTHROID) 75 MCG tablet Take 75 mcg by mouth Daily       No current facility-administered medications on file prior to encounter. REVIEW OF SYSTEMS:     Constitutional: +pain-bilateral groin Denies fever, chills, night sweats, fatigue, weight loss/gain, loss of appetite   Head: Denies headache,  dizziness, loss of consciousness  Respiratory: Denies shortness of breath, cough, wheezing, dyspnea with exertion  Cardiovascular:Denies chest pain, palpitations, edema  Gastrointestinal: Denies nausea, vomiting, constipation, diarrhea, abdominal pain   TED: Denies dysuria, frequency, urgency, hematuria  Musculoskeletal: Denies joint pain, swelling , stiffness,  Endocrine: Denies polyuria, polydipsia, cold or heat intolerance  Hematology: Denies easy brusing or bleeding, hx of clotting disorder  Dermatology: +wound Denies skin rash, eczema, pruritis    PHYSICAL EXAM:     BP (!) 184/80   Pulse 81   Temp 97.2 °F (36.2 °C) (Infrared)   Resp 18   SpO2 96%   Wt Readings from Last 3 Encounters:   02/06/23 193 lb (87.5 kg)   02/06/23 193 lb (87.5 kg)   04/14/22 184 lb 6.4 oz (83.6 kg)       General:  Awake, alert, no apparent distress. Appears stated age  [de-identified]: conjuctivae are clear without exudate or hemorrhage, anicteric sclera, moist oral mucosa. Chest:  Respirations regular, non-labored.   Chest rise and fall equal bilaterally. Neurological: Awake, alert, oriented x4  Psychiatric:  Appropriate mood and affect  Extremities: non-traumatic in appearance. Gait WNL  Skin:  Warm and dry  Wound:    Right groin wound bed granular/hypergranular. Periwound dry, flaky with blanchable erythema. Left groin wound bed granular/hypergranular with slough. Periwound dry, flaky with blanchable erythema. Wound 01/26/23 Groin Right (Active)   Wound Image   02/13/23 1505   Wound Etiology Non-Healing Surgical 02/07/23 0810   Dressing Status Clean;Dry; Intact 02/07/23 0810   Wound Cleansed Cleansed with saline 02/06/23 0820   Dressing/Treatment Dry dressing;Silicone border 77/14/06 0810   Offloading for Diabetic Foot Ulcers Offloading not required 01/26/23 1514   Wound Length (cm) 2.3 cm 02/13/23 1505   Wound Width (cm) 1.2 cm 02/13/23 1505   Wound Depth (cm) 0.1 cm 02/13/23 1505   Wound Surface Area (cm^2) 2.76 cm^2 02/13/23 1505   Change in Wound Size % (l*w) 79.43 02/13/23 1505   Wound Volume (cm^3) 0.276 cm^3 02/13/23 1505   Wound Healing % 93 02/13/23 1505   Wound Assessment Granulation tissue; Hyper granulation tissue 02/13/23 1505   Drainage Amount Large 02/13/23 1505   Drainage Description Sanguinous;Brown 02/13/23 1505   Odor Moderate 02/13/23 1505   Rebekah-wound Assessment Blanchable erythema;Dry/flaky 02/13/23 1505   Margins Attached edges 02/13/23 1505   Wound Thickness Description not for Pressure Injury Full thickness 02/13/23 1505   Number of days: 18       Wound 01/26/23 Groin Left (Active)   Wound Image   01/26/23 1514   Wound Etiology Non-Healing Surgical 02/07/23 0810   Dressing Status Clean;Dry; Intact 02/07/23 0810   Wound Cleansed Cleansed with saline 02/06/23 0820   Dressing/Treatment Dry dressing;Silicone border 20/51/53 0810   Offloading for Diabetic Foot Ulcers Offloading not required 01/26/23 1514   Wound Length (cm) 3 cm 02/13/23 1505   Wound Width (cm) 3 cm 02/13/23 1505   Wound Depth (cm) 0.1 cm 02/13/23 1505 Wound Surface Area (cm^2) 9 cm^2 02/13/23 1505   Change in Wound Size % (l*w) 51.92 02/13/23 1505   Wound Volume (cm^3) 0.9 cm^3 02/13/23 1505   Wound Healing % 76 02/13/23 [address]; Hyper granulation tissue;Granulation tissue 02/13/23 1505   Drainage Amount Large 02/13/23 1505   Drainage Description Sanguinous;Brown 02/13/23 1505   Odor Moderate 02/13/23 1505   Rebekah-wound Assessment Dry/flaky; Blanchable erythema 02/13/23 1505   Margins Attached edges 02/13/23 1505   Wound Thickness Description not for Pressure Injury Full thickness 02/13/23 1505   Number of days: 18       LABS/IMAGING     UA: No results for input(s): SPECGRAV, PHUR, COLORU, CLARITYU, MUCUS, PROTEINU, BLOODU, RBCUA, WBCUA, BACTERIA, NITRU, GLUCOSEU, BILIRUBINUR, UROBILINOGEN, KETUA, LABCAST, LABCASTTY, AMORPHOS in the last 72 hours. Invalid input(s): CRYSTALS  Micro:   Lab Results   Component Value Date/Time    [facility]  02/06/2023 02:55 AM     No growth 24 hours. No growth 48 hours. No growth at 5 days     ASSESSMENT     -nonhealing surgical wound, bilateral groins  -surgical wound dehiscence     Ulcer Identification:  Ulcer Type: non-healing surgical  Contributing Factors: obesity and smoking    Depth of Diabetic/Pressure/Non Pressure Ulcers or Wound:  Wound, full thickness     Patient Active Problem List   Diagnosis Code    BETY (generalized anxiety disorder) F41.1    Chronic post-traumatic stress disorder (PTSD) F43.12    Insomnia related to another mental disorder F51.05    Major depressive disorder, single episode, moderate (HCC) F32.1    Insomnia, persistent G47.00    Nonhealing surgical wound T81.89XA    History of arterial bypass of lower extremity Z95.828    Hypertension I10    Hypothyroidism E03.9    Overweight E66.3    Peripheral arterial disease (HCC) I73.9    Altered mental status R41.82       PROCEDURE NOTE     Chemical Cautery    Performed by: EDD Mata - DEZ    Consent obtained?  Yes    Time out taken: Yes    Tissue:  [x] Hypergranulation   [] Hyperkeratotic      Pain Control:   n/a    Method: silver nitrate    Location of Chemical Cautery:   Wound/Ulcer #1 and 2     Wound 01/26/23 Groin Right (Active)   Wound Image   02/13/23 1505   Wound Etiology Non-Healing Surgical 02/07/23 0810   Dressing Status Clean;Dry; Intact 02/07/23 0810   Wound Cleansed Cleansed with saline 02/06/23 0820   Dressing/Treatment Dry dressing;Silicone border 02/77/93 0810   Offloading for Diabetic Foot Ulcers Offloading not required 01/26/23 1514   Wound Length (cm) 2.3 cm 02/13/23 1505   Wound Width (cm) 1.2 cm 02/13/23 1505   Wound Depth (cm) 0.1 cm 02/13/23 1505   Wound Surface Area (cm^2) 2.76 cm^2 02/13/23 1505   Change in Wound Size % (l*w) 79.43 02/13/23 1505   Wound Volume (cm^3) 0.276 cm^3 02/13/23 1505   Wound Healing % 93 02/13/23 1505   Wound Assessment Granulation tissue; Hyper granulation tissue 02/13/23 1505   Drainage Amount Large 02/13/23 1505   Drainage Description Sanguinous;Brown 02/13/23 1505   Odor Moderate 02/13/23 1505   Rebekah-wound Assessment Blanchable erythema;Dry/flaky 02/13/23 1505   Margins Attached edges 02/13/23 1505   Wound Thickness Description not for Pressure Injury Full thickness 02/13/23 1505   Number of days: 18       Wound 01/26/23 Groin Left (Active)   Wound Image   01/26/23 1514   Wound Etiology Non-Healing Surgical 02/07/23 0810   Dressing Status Clean;Dry; Intact 02/07/23 0810   Wound Cleansed Cleansed with saline 02/06/23 0820   Dressing/Treatment Dry dressing;Silicone border 67/88/10 0810   Offloading for Diabetic Foot Ulcers Offloading not required 01/26/23 1514   Wound Length (cm) 3 cm 02/13/23 1505   Wound Width (cm) 3 cm 02/13/23 1505   Wound Depth (cm) 0.1 cm 02/13/23 1505   Wound Surface Area (cm^2) 9 cm^2 02/13/23 1505   Change in Wound Size % (l*w) 51.92 02/13/23 1505   Wound Volume (cm^3) 0.9 cm^3 02/13/23 1505   Wound Healing % 76 02/13/23 [address]; Hyper granulation tissue;Granulation tissue 02/13/23 1505   Drainage Amount Large 02/13/23 1505   Drainage Description Sanguinous;Brown 02/13/23 1505   Odor Moderate 02/13/23 1505   Rebekah-wound Assessment Dry/flaky; Blanchable erythema 02/13/23 1505   Margins Attached edges 02/13/23 1505   Wound Thickness Description not for Pressure Injury Full thickness 02/13/23 1505   Number of days: 18       Procedural Pain: 0  / 10     Post Procedural Pain: 0 / 10     Response to treatment: Well tolerated by patient. PLAN     Patient examined and evaluated. All available lab work, radiology studies, and progress notes pertaining to Jeremias Mayo reviewed prior to or during patient visit today.    -nonhealing surgical wound, bilateral groins, surgical wound dehiscence - Wound appearance signficantly improved, measuring smaller. Hypergranulation chemically cauterized today to promote healing. Continue hydroferra blue and silicone bordered foam dressings three times weekly per home health. Patient is unable to complete dressing changes independently due to location of wounds, states she does not have anyone at home to assist with wound care.      -No indications of infection noted at today's visit. Education provided on signs and symptoms of infection. Call clinic or seek emergency care should these occur. Jeremias Mayo continues to smoke 0.4 cigarettes per day. Discussed importance of smoking cessation on improvement of general health. Educated patient on the effects of smoking on wound progression and the importance of cutting back/quitting to promote better healing. Patient has been actively working on quitting. Encouragement provided to continue these efforts. Follow up 3 weeks. Call clinic with any needs or concerns prior to scheduled visit. All questions and concerns addressed prior to discharge from today's visit.     Please see attached Discharge Instructions    Written patient dismissal instructions given to patient and signed by patient or POA. Discharge Instructions       Discharge Instructions         Visit Discharge/Physician Orders:  - Silver nitrate to both wounds today   - you may notice gray drainage at next dressing change, this is normal         Home [facility] - [facility]    Wound Location: Bilateral groin     Dressing orders:      1) Gather wound care supplies and arrange on clean table. 2) Wash your hands with soap and water or use alcohol based hand  for 20 seconds (sing \"Happy Birthday\" twice). 3) Cleanse wounds with normal saline or wound cleanser and gauze. Pat dry with clean gauze. 4) Bilateral groin - Apply hydraferra blue to wound (cut to fit and moisten with saline) Cover with silicone bordered foam. Change 3 times weekly. Keep all dressings clean & dry. Follow up visit:   3 Weeks on Monday March 6th at 3pm     Supplies: [facility] to order supplies     Keep next scheduled appointment. Please give 24 hour notice if unable to keep appointment. [phone]     If you experience any of the following, please call the Wound Care Service during business hours: Monday through Friday 8:00 am - 4:30 pm  ([phone]). *Increase in pain              *Temperature over 101              *Increase in drainage from your wound or a foul odor              *Uncontrolled swelling              *Need for compression bandage changes due to slippage, breakthrough drainage     If you need medical attention outside of business hours, please contact your Primary Care Doctor or go to the nearest emergency room.                    Electronically signed by EDD Mata CNP on 2/13/2023 at 3:31 PM

## 2023-02-13 NOTE — PLAN OF CARE
Problem: Wound:  Goal: Will show signs of wound healing; wound closure and no evidence of infection  Description: Will show signs of wound healing; wound closure and no evidence of infection  Outcome: Progressing  Note: Patient seen for bilateral groin wound. Wound shows signs of proper closure and healing. No s/s of infection noted. Follow up in 3 weeks. Silver nitrate to both wounds today. See AVS for order changes.

## 2023-02-13 NOTE — DISCHARGE INSTRUCTIONS
Visit Discharge/Physician Orders:  - Silver nitrate to both wounds today   - you may notice gray drainage at next dressing change, this is normal         Home Health- [facility] - [facility]    Wound Location: Bilateral groin     Dressing orders:      1) Gather wound care supplies and arrange on clean table. 2) Wash your hands with soap and water or use alcohol based hand  for 20 seconds (sing \"Happy Birthday\" twice). 3) Cleanse wounds with normal saline or wound cleanser and gauze. Pat dry with clean gauze. 4) Bilateral groin - Apply hydraferra blue to wound (cut to fit and moisten with saline) Cover with silicone bordered foam. Change 3 times weekly. Keep all dressings clean & dry. Follow up visit:   3 Weeks on Monday March 6th at 3pm     Supplies: [facility] to order supplies     Keep next scheduled appointment. Please give 24 hour notice if unable to keep appointment. [phone]     If you experience any of the following, please call the Wound Care Service during business hours: Monday through Friday 8:00 am - 4:30 pm  ([phone]). *Increase in pain              *Temperature over 101              *Increase in drainage from your wound or a foul odor              *Uncontrolled swelling              *Need for compression bandage changes due to slippage, breakthrough drainage     If you need medical attention outside of business hours, please contact your Primary Care Doctor or go to the nearest emergency room.

## 2023-02-16 NOTE — PROGRESS NOTES
Physician Progress Note      Angelica Poon  [facility] #:                  [id number]  :                       1955  ADMIT DATE:       2023 1:56 AM  DISCH DATE:        2023 1:19 PM  RESPONDING  PROVIDER #:        Mary Ann Perea PA-C          QUERY TEXT:    Pt admitted with encephalopathy. Pt noted to have abnormal MRI and   hyponatremia. If possible, please document in progress notes and discharge   summary the relationship, if any, between encephalopathy and the following: The medical record reflects the following:  Risk Factors: encephalopathy  Clinical Indicators: AMS, which resolved after IVF and Na correction, MRI   showed possible infarcts, but patient left AMA prior to neurology being able   to see patient  Treatment: Labs, imaging, monitoring  Options provided:  -- Metabolic encephalopathy due to hyponatremia  -- Metabolic encephalopathy due to subacute infarcts  -- Other - I will add my own diagnosis  -- Disagree - Not applicable / Not valid  -- Disagree - Clinically unable to determine / Unknown  -- Refer to Clinical Documentation Reviewer    PROVIDER RESPONSE TEXT:    Provider is clinically unable to determine a response to this query. It is difficult to determine true etiology of patient's hallucinations and   psychosis but there is strong indication that likely her abnormal MRI is   contributing - it did not show acute infarcts but rather areas of abnormal   enhancement which could be subacute infarcts vs infection vs inflammation.     Query created by: Devorah Pradhan on 2023 10:34 AM      Electronically signed by:  Mary Ann Perea PA-C 2023 10:42 AM

## 2023-03-13 ENCOUNTER — HOSPITAL ENCOUNTER (OUTPATIENT)
Dept: WOUND CARE | Age: 68
Discharge: HOME | End: 2023-03-13
Payer: MEDICARE

## 2023-03-13 VITALS
SYSTOLIC BLOOD PRESSURE: 157 MMHG | HEART RATE: 93 BPM | RESPIRATION RATE: 18 BRPM | TEMPERATURE: 97.2 F | OXYGEN SATURATION: 93 % | DIASTOLIC BLOOD PRESSURE: 74 MMHG

## 2023-03-13 DIAGNOSIS — T81.89XA NON-HEALING SURGICAL WOUND, INITIAL ENCOUNTER: ICD-10-CM

## 2023-03-13 DIAGNOSIS — T81.89XD NON-HEALING SURGICAL WOUND, SUBSEQUENT ENCOUNTER: Primary | ICD-10-CM

## 2023-03-13 PROCEDURE — 6370000000 HC RX 637 (ALT 250 FOR IP): Performed by: NURSE PRACTITIONER

## 2023-03-13 PROCEDURE — 17250 CHEM CAUT OF GRANLTJ TISSUE: CPT

## 2023-03-13 PROCEDURE — 17250 CHEM CAUT OF GRANLTJ TISSUE: CPT | Performed by: NURSE PRACTITIONER

## 2023-03-13 RX ORDER — LIDOCAINE 40 MG/G
CREAM TOPICAL ONCE
OUTPATIENT
Start: 2023-03-13 | End: 2023-03-13

## 2023-03-13 RX ORDER — HYDROCODONE BITARTRATE AND ACETAMINOPHEN 5; 325 MG/1; MG/1
1 TABLET ORAL EVERY 6 HOURS PRN
COMMUNITY

## 2023-03-13 RX ORDER — LIDOCAINE HYDROCHLORIDE 20 MG/ML
JELLY TOPICAL ONCE
OUTPATIENT
Start: 2023-03-13 | End: 2023-03-13

## 2023-03-13 RX ORDER — CLOBETASOL PROPIONATE 0.5 MG/G
OINTMENT TOPICAL ONCE
OUTPATIENT
Start: 2023-03-13 | End: 2023-03-13

## 2023-03-13 RX ORDER — GENTAMICIN SULFATE 1 MG/G
OINTMENT TOPICAL ONCE
OUTPATIENT
Start: 2023-03-13 | End: 2023-03-13

## 2023-03-13 RX ORDER — LIDOCAINE 50 MG/G
OINTMENT TOPICAL ONCE
OUTPATIENT
Start: 2023-03-13 | End: 2023-03-13

## 2023-03-13 RX ORDER — BACITRACIN ZINC AND POLYMYXIN B SULFATE 500; 1000 [USP'U]/G; [USP'U]/G
OINTMENT TOPICAL ONCE
OUTPATIENT
Start: 2023-03-13 | End: 2023-03-13

## 2023-03-13 RX ORDER — GINSENG 100 MG
CAPSULE ORAL ONCE
OUTPATIENT
Start: 2023-03-13 | End: 2023-03-13

## 2023-03-13 RX ORDER — LIDOCAINE HYDROCHLORIDE 40 MG/ML
SOLUTION TOPICAL ONCE
OUTPATIENT
Start: 2023-03-13 | End: 2023-03-13

## 2023-03-13 RX ORDER — BACITRACIN, NEOMYCIN, POLYMYXIN B 400; 3.5; 5 [USP'U]/G; MG/G; [USP'U]/G
OINTMENT TOPICAL ONCE
OUTPATIENT
Start: 2023-03-13 | End: 2023-03-13

## 2023-03-13 RX ADMIN — SILVER NITRATE APPLICATORS 1 EACH: 25; 75 STICK TOPICAL at 15:02

## 2023-03-13 ASSESSMENT — PAIN DESCRIPTION - LOCATION: LOCATION: GROIN

## 2023-03-13 ASSESSMENT — PAIN DESCRIPTION - ORIENTATION: ORIENTATION: LEFT

## 2023-03-13 ASSESSMENT — PAIN SCALES - GENERAL: PAINLEVEL_OUTOF10: 9

## 2023-03-13 ASSESSMENT — PAIN DESCRIPTION - DESCRIPTORS: DESCRIPTORS: BURNING

## 2023-03-13 NOTE — DISCHARGE INSTRUCTIONS
Visit Discharge/Physician Orders:  - Silver nitrate to left groin wound today   - you may notice gray drainage at next dressing change, this is normal      Home [facility]     Wound Location: Bilateral groin     Dressing orders:      1) Gather wound care supplies and arrange on clean table. 2) Wash your hands with soap and water or use alcohol based hand  for 20 seconds (sing \"Happy Birthday\" twice). 3) Cleanse wounds with normal saline or wound cleanser and gauze. Pat dry with clean gauze. 4) left groin - Apply hydraferra blue to wound (cut to fit and moisten with saline) Cover with silicone bordered foam. Change 3 times weekly. right groin- open to air     Keep all dressings clean & dry. Follow up visit:   3 Weeks on Monday April 3rd at 3pm     Supplies: [facility] to order supplies     Keep next scheduled appointment. Please give 24 hour notice if unable to keep appointment. [phone]     If you experience any of the following, please call the Wound Care Service during business hours: Monday through Friday 8:00 am - 4:30 pm  ([phone]). *Increase in pain              *Temperature over 101              *Increase in drainage from your wound or a foul odor              *Uncontrolled swelling              *Need for compression bandage changes due to slippage, breakthrough drainage     If you need medical attention outside of business hours, please contact your Primary Care Doctor or go to the nearest emergency room.

## 2023-03-13 NOTE — PLAN OF CARE
Problem: Wound:  Goal: Will show signs of wound healing; wound closure and no evidence of infection  Description: Will show signs of wound healing; wound closure and no evidence of infection  Outcome: Progressing   Pt. Seen today for bilateral groin wounds see AVS for new orders. Follow up in 3 weeks. Care plan reviewed with patient. Patient verbalize understanding of the plan of care and contribute to goal setting.

## 2023-03-13 NOTE — PROGRESS NOTES
[facility]  Consult and Procedure Note      [address] RECORD NUMBER:  [id number]  AGE: 74 y.o. GENDER: female  : 1955  EPISODE DATE:  3/13/2023    SUBJECTIVE:     Chief Complaint   Patient presents with    Wound Check     Bilateral groins      HISTORY OF PRESENT ILLNESS      Lenka Shearer is a 74 y.o. female who presents today for evaluation of bilateral groin wounds. Patient recently underwent  femoral-femoral bypass surgery with Dr. Allegra Gonsalez. At follow up she was found to have infection and superficial fatty necrosis at incision lines bilaterally. She was then taken to OR for I&D pulse lavage 23 per Dr. Allegra Gonsalez. Wound vac was placed at that time. At initial visit she noted extreme pain related to wound vac. This was discontinued, hydroferra blue secured with silicone bordered foam was ordered alternatively. She notes significant improvement of pain since stopping vac. Chemical cauterization was completed at last visit, she notes significant improvement of wound since that time. She denies any concerns with wound care as ordered. She is a current smoker, states that she has been actively working on quitting, is down to 4 cigarettes per day. She denies history of DM. She denies any fevers or chills. Denies any further needs or concerns. PAST MEDICAL HISTORY             Diagnosis Date    Anxiety     COPD (chronic obstructive pulmonary disease) ([address]     Hypertension     Osteoporosis     Thyroid disease      PAST SURGICAL HISTORY     Past Surgical History:   Procedure Laterality Date    HIP SURGERY Left      FAMILY HISTORY     History reviewed. No pertinent family history.     SOCIAL HISTORY     Social History     Tobacco Use    Smoking status: Every Day     Packs/day: 0.25     Types: Cigarettes     Passive exposure: Never    Smokeless tobacco: Never    Tobacco comments:     Given in new patient packet   Vaping Use    Vaping Use: Never used   Substance Use Topics Alcohol use: No    Drug use: No     ALLERGIES     No Known Allergies    MEDICATIONS     Current Outpatient Medications on File Prior to Encounter   Medication Sig Dispense Refill    HYDROcodone-acetaminophen (NORCO) 5-325 MG per tablet Take 1 tablet by mouth every 6 hours as needed for Pain.      simvastatin (ZOCOR) 40 MG tablet Take 40 mg by mouth nightly      aspirin 81 MG EC tablet Take 81 mg by mouth daily      QUEtiapine (SEROQUEL XR) 300 MG extended release tablet Take 1 tablet by mouth nightly 30 tablet 0    escitalopram (LEXAPRO) 20 MG tablet Take 1 tablet by mouth in the morning. (Patient not taking: No sig reported) 30 tablet 1    propranolol (INDERAL) 20 MG tablet Take 1 tablet by mouth in the morning and 1 tablet at noon and 1 tablet before bedtime. (Patient not taking: No sig reported) 90 tablet 0    metoprolol tartrate (LOPRESSOR) 50 MG tablet Take 50 mg by mouth 2 times daily      levothyroxine (SYNTHROID) 75 MCG tablet Take 75 mcg by mouth Daily       No current facility-administered medications on file prior to encounter.        REVIEW OF SYSTEMS:     Constitutional:  Denies fever, chills, night sweats, fatigue, weight loss/gain, loss of appetite   Head: Denies headache,  dizziness, loss of consciousness  Respiratory: Denies shortness of breath, cough, wheezing, dyspnea with exertion  Cardiovascular:Denies chest pain, palpitations, edema  Gastrointestinal: Denies nausea, vomiting, constipation, diarrhea, abdominal pain   TED: Denies dysuria, frequency, urgency, hematuria  Musculoskeletal: Denies joint pain, swelling , stiffness,  Endocrine: Denies polyuria, polydipsia, cold or heat intolerance  Hematology: Denies easy brusing or bleeding, hx of clotting disorder  Dermatology: +wound Denies skin rash, eczema, pruritis    PHYSICAL EXAM:     BP (!) 157/74   Pulse 93   Temp 97.2 °F (36.2 °C) (Infrared)   Resp 18   SpO2 93%   Wt Readings from Last 3 Encounters:   02/06/23 193 lb (87.5 kg)   02/06/23 193 lb (87.5 kg)   04/14/22 184 lb 6.4 oz (83.6 kg)       General:  Awake, alert, no apparent distress. Appears stated age  [de-identified]: conjuctivae are clear without exudate or hemorrhage, anicteric sclera, moist oral mucosa. Chest:  Respirations regular, non-labored. Chest rise and fall equal bilaterally. Neurological: Awake, alert, oriented x4  Psychiatric:  Appropriate mood and affect  Extremities: non-traumatic in appearance. Gait WNL  Skin:  Warm and dry  Wound:    Right groin wound has healed. Left groin wound bed granular. Periwound dry, flaky with blanchable erythema. Wound 01/26/23 Groin Right (Active)   Wound Image   03/13/23 1457   Wound Etiology Non-Healing Surgical 03/13/23 1457   Dressing Status Clean;Dry; Intact 03/13/23 1457   Wound Cleansed Cleansed with saline 03/13/23 1457   Dressing/Treatment Open to air 03/13/23 1457   Offloading for Diabetic Foot Ulcers Offloading not required 02/13/23 1505   Wound Length (cm) 0 cm 03/13/23 1457   Wound Width (cm) 0 cm 03/13/23 1457   Wound Depth (cm) 0 cm 03/13/23 1457   Wound Surface Area (cm^2) 0 cm^2 03/13/23 1457   Change in Wound Size % (l*w) 100 03/13/23 1457   Wound Volume (cm^3) 0 cm^3 03/13/23 1457   Wound Healing % 100 03/13/23 1457   Wound Assessment Granulation tissue; Hyper granulation tissue 02/13/23 1505   Drainage Amount Large 02/13/23 1505   Drainage Description Sanguinous;Brown 02/13/23 1505   Odor Moderate 02/13/23 1505   Rebekah-wound Assessment Blanchable erythema;Dry/flaky 02/13/23 1505   Margins Attached edges 02/13/23 1505   Wound Thickness Description not for Pressure Injury Full thickness 02/13/23 1505   Number of days: 45       Wound 01/26/23 Groin Left (Active)   Wound Image   03/13/23 1457   Wound Etiology Non-Healing Surgical 03/13/23 1457   Dressing Status Intact; Old drainage noted 03/13/23 1457   Wound Cleansed Cleansed with saline 03/13/23 1457   Dressing/Treatment Hydrofera blue;Silicone border 39/65/18 1457   Offloading for Diabetic Foot Ulcers Offloading not required 03/13/23 1457   Wound Length (cm) 0.6 cm 03/13/23 1457   Wound Width (cm) 0.5 cm 03/13/23 1457   Wound Depth (cm) 0.1 cm 03/13/23 1457   Wound Surface Area (cm^2) 0.3 cm^2 03/13/23 1457   Change in Wound Size % (l*w) 98.4 03/13/23 1457   Wound Volume (cm^3) 0.03 cm^3 03/13/23 1457   Wound Healing % 99 03/13/23 1457   Wound Assessment Granulation tissue 03/13/23 1457   Drainage Amount Small 03/13/23 1457   Drainage Description Serosanguinous 03/13/23 1457   Odor None 03/13/23 1457   Rebekah-wound Assessment Dry/flaky; Intact 03/13/23 1457   Margins Attached edges 03/13/23 1457   Wound Thickness Description not for Pressure Injury Full thickness 03/13/23 1457   Number of days: 45       LABS/IMAGING     UA: No results for input(s): SPECGRAV, PHUR, COLORU, CLARITYU, MUCUS, PROTEINU, BLOODU, RBCUA, WBCUA, BACTERIA, NITRU, GLUCOSEU, BILIRUBINUR, UROBILINOGEN, KETUA, LABCAST, LABCASTTY, AMORPHOS in the last 72 hours. Invalid input(s): CRYSTALS  Micro:   Lab Results   Component Value Date/Time    [facility]  02/06/2023 02:55 AM     No growth 24 hours. No growth 48 hours.  No growth at 5 days     ASSESSMENT     -nonhealing surgical wound, bilateral groins    Ulcer Identification:  Ulcer Type: non-healing surgical  Contributing Factors: obesity and smoking    Depth of Diabetic/Pressure/Non Pressure Ulcers or Wound:  Wound, full thickness     Patient Active Problem List   Diagnosis Code    BETY (generalized anxiety disorder) F41.1    Chronic post-traumatic stress disorder (PTSD) F43.12    Insomnia related to another mental disorder F51.05    Major depressive disorder, single episode, moderate (HCC) F32.1    Insomnia, persistent G47.00    Nonhealing surgical wound T81.89XA    History of arterial bypass of lower extremity Z95.828    Hypertension I10    Hypothyroidism E03.9    Overweight E66.3    Peripheral arterial disease (HCC) I73.9    Altered mental status R41.82       PROCEDURE NOTE Chemical Cautery    Performed by: EDD Mendoza CNP    Consent obtained? Yes    Time out taken: Yes    Tissue:  [x] Hypergranulation   [] Hyperkeratotic      Pain Control:   n/a    Method: silver nitrate    Location of Chemical Cautery:   Wound/Ulcer #2     Wound 01/26/23 Groin Right (Active)   Wound Image   03/13/23 1457   Wound Etiology Non-Healing Surgical 03/13/23 1457   Dressing Status Clean;Dry; Intact 03/13/23 1457   Wound Cleansed Cleansed with saline 03/13/23 1457   Dressing/Treatment Open to air 03/13/23 1457   Offloading for Diabetic Foot Ulcers Offloading not required 02/13/23 1505   Wound Length (cm) 0 cm 03/13/23 1457   Wound Width (cm) 0 cm 03/13/23 1457   Wound Depth (cm) 0 cm 03/13/23 1457   Wound Surface Area (cm^2) 0 cm^2 03/13/23 1457   Change in Wound Size % (l*w) 100 03/13/23 1457   Wound Volume (cm^3) 0 cm^3 03/13/23 1457   Wound Healing % 100 03/13/23 1457   Wound Assessment Granulation tissue; Hyper granulation tissue 02/13/23 1505   Drainage Amount Large 02/13/23 1505   Drainage Description Sanguinous;Brown 02/13/23 1505   Odor Moderate 02/13/23 1505   Rebekah-wound Assessment Blanchable erythema;Dry/flaky 02/13/23 1505   Margins Attached edges 02/13/23 1505   Wound Thickness Description not for Pressure Injury Full thickness 02/13/23 1505   Number of days: 45       Wound 01/26/23 Groin Left (Active)   Wound Image   03/13/23 1457   Wound Etiology Non-Healing Surgical 03/13/23 1457   Dressing Status Intact; Old drainage noted 03/13/23 1457   Wound Cleansed Cleansed with saline 03/13/23 1457   Dressing/Treatment Hydrofera blue;Silicone border 81/60/65 1457   Offloading for Diabetic Foot Ulcers Offloading not required 03/13/23 1457   Wound Length (cm) 0.6 cm 03/13/23 1457   Wound Width (cm) 0.5 cm 03/13/23 1457   Wound Depth (cm) 0.1 cm 03/13/23 1457   Wound Surface Area (cm^2) 0.3 cm^2 03/13/23 1457   Change in Wound Size % (l*w) 98.4 03/13/23 1457   Wound Volume (cm^3) 0.03 cm^3 03/13/23 1457   Wound Healing % 99 03/13/23 1457   Wound Assessment Granulation tissue 03/13/23 1457   Drainage Amount Small 03/13/23 1457   Drainage Description Serosanguinous 03/13/23 1457   Odor None 03/13/23 1457   Rebekah-wound Assessment Dry/flaky; Intact 03/13/23 1457   Margins Attached edges 03/13/23 1457   Wound Thickness Description not for Pressure Injury Full thickness 03/13/23 1457   Number of days: 45       Procedural Pain: 0  / 10     Post Procedural Pain: 0 / 10     Response to treatment: Well tolerated by patient. PLAN     Patient examined and evaluated. All available lab work, radiology studies, and progress notes pertaining to Lenka Shearer reviewed prior to or during patient visit today.    -nonhealing surgical wound, bilateral groins, surgical wound dehiscence - Right sided wound has healed, discontinue previous wound care. Left sided wound is significantly smaller today. Chemical cauterization completed again today to promote healing, patient tolerated well. Continue hydroferra blue and silicone bordered foam dressings three times weekly per home health. Patient is unable to complete dressing changes independently due to location of wounds, states she does not have anyone at home to assist with wound care.      -No indications of infection noted at today's visit. Education provided on signs and symptoms of infection. Call clinic or seek emergency care should these occur. Lenka Shearer continues to smoke 4 cigarettes per day. Discussed importance of smoking cessation on improvement of general health. Educated patient on the effects of smoking on wound progression and the importance of cutting back/quitting to promote better healing. Patient has been actively working on quitting. Encouragement provided to continue these efforts. Follow up 3 weeks. Call clinic with any needs or concerns prior to scheduled visit.     All questions and concerns addressed prior to discharge from today's visit. Please see attached Discharge Instructions    Written patient dismissal instructions given to patient and signed by patient or POA.          Orders Placed This Encounter   Procedures    Initiate Outpatient Wound Care Protocol     Cleanse wound with saline    If wound contains bioburden or contamination cleanse with wound cleanser or antimicrobial solution     For normal periwound tissue without irritation nor maceration, apply topical skin protectant    For periwound tissue with irritation and/or maceration, apply zinc based product, topical steroid cream/ointment, or equivalent     For wounds with dry firm black eschar and/or without exudate, apply betadine and leave open to air      For wounds with scant/small to no exudate or drainage, apply wound gel, hydrocolloid, polymer, or equivalent and cover with secondary dressing/foam      For wounds with moderate/large exudate or drainage, apply alginate, hydrofiber, polymer, or equivalent and cover with secondary dressing/foam    For wounds with nonviable tissue requiring removal, apply chemical or mechanical debrider and cover with secondary dressing/foam    For wounds with tunneling, dead space, or cavity, fill or pack with strip/gauze/kerlex to fit and cover with secondary dressing/foam    For wounds with adequate granulation or epithelization, apply wound gel, hydrocolloid, polymer, collagen, or transparent film, and cover secondary dry dressing/foam    For wounds that need additional secondary dressing to help pad or control additional drainage/exudates, add foam, absorbent pad or hydrocolloid    For wounds with suspected or known infection, apply antimicrobial mesh and/or antimicrobial alginate/hydrofiber, or antimicrobial solution moistened gauze/kerlex, or equivalent and cover with secondary dressing/foam    Compression Management needed for edema control, apply multilayer compression or tubular garment or equivalent    Offloading Management needed for pressure relief, apply offloading shoe/boot or equivalent     Standing Status:   Standing     Number of Occurrences:   1    Misc nursing order (specify)     Scheduling Instructions:      Visit Discharge/Physician Orders:      - Silver nitrate to left groin wound today       - you may notice gray drainage at next dressing change, this is normal              Home Health- [facility] - [facility]             Wound Location: Bilateral groin             Dressing orders:              1) Gather wound care supplies and arrange on clean table. 2) Wash your hands with soap and water or use alcohol based hand  for 20 seconds (sing \"Happy Birthday\" twice). 3) Cleanse wounds with normal saline or wound cleanser and gauze. Pat dry with clean gauze. 4) left groin - Apply hydraferra blue to wound (cut to fit and moisten with saline) Cover with silicone bordered foam. Change 3 times weekly. right groin- open to air             Keep all dressings clean & dry. Follow up visit:   3 Weeks on Monday April 3rd at 3pm             Supplies: [facility] to order supplies             Keep next scheduled appointment. Please give 24 hour notice if unable to keep appointment. [phone]             If you experience any of the following, please call the Wound Care Service during business hours: Monday through Friday 8:00 am - 4:30 pm  ([phone]). *Increase in pain                  *Temperature over 101                  *Increase in drainage from your wound or a foul odor                  *Uncontrolled swelling                  *Need for compression bandage changes due to slippage, breakthrough drainage             If you need medical attention outside of business hours, please contact your Primary Care Doctor or go to the nearest emergency room.          Discharge Instructions       Discharge Instructions         Visit Discharge/Physician Orders:  - Silver nitrate to left groin wound today   - you may notice gray drainage at next dressing change, this is normal      Home Health- [facility]     Wound Location: Bilateral groin     Dressing orders:      1) Gather wound care supplies and arrange on clean table. 2) Wash your hands with soap and water or use alcohol based hand  for 20 seconds (sing \"Happy Birthday\" twice). 3) Cleanse wounds with normal saline or wound cleanser and gauze. Pat dry with clean gauze. 4) left groin - Apply hydraferra blue to wound (cut to fit and moisten with saline) Cover with silicone bordered foam. Change 3 times weekly. right groin- open to air     Keep all dressings clean & dry. Follow up visit:   3 Weeks on Monday April 3rd at 3pm     Supplies: Home health to order supplies     Keep next scheduled appointment. Please give 24 hour notice if unable to keep appointment. [phone]     If you experience any of the following, please call the Wound Care Service during business hours: Monday through Friday 8:00 am - 4:30 pm  ([phone]). *Increase in pain              *Temperature over 101              *Increase in drainage from your wound or a foul odor              *Uncontrolled swelling              *Need for compression bandage changes due to slippage, breakthrough drainage     If you need medical attention outside of business hours, please contact your Primary Care Doctor or go to the nearest emergency room.        Electronically signed by EDD Eli CNP on 3/13/2023 at 3:29 PM

## 2023-03-27 ENCOUNTER — TELEPHONE (OUTPATIENT)
Dept: WOUND CARE | Age: 68
End: 2023-03-27

## 2023-03-27 NOTE — TELEPHONE ENCOUNTER
Prince Hinojosa from [facility] called and states one of the patients groin wounds is healed and the other side is very small. She is asking if dressing change frequency can be decreased from 3 times weekly to 2 times weekly. Discussed with BRII Walters CNP. She okay with decreasing dressing change to twice weekly, notified Prince Hinojosa of this. No further concerns voiced.

## 2023-04-03 ENCOUNTER — HOSPITAL ENCOUNTER (OUTPATIENT)
Dept: WOUND CARE | Age: 68
Discharge: HOME | End: 2023-04-03
Payer: MEDICARE

## 2023-04-03 VITALS
RESPIRATION RATE: 18 BRPM | TEMPERATURE: 96.6 F | HEART RATE: 74 BPM | OXYGEN SATURATION: 95 % | SYSTOLIC BLOOD PRESSURE: 120 MMHG | DIASTOLIC BLOOD PRESSURE: 63 MMHG

## 2023-04-03 DIAGNOSIS — T81.89XA NON-HEALING SURGICAL WOUND, INITIAL ENCOUNTER: Primary | ICD-10-CM

## 2023-04-03 PROBLEM — T81.31XA RUPTURE OF OPERATION WOUND: Status: ACTIVE | Noted: 2023-04-03

## 2023-04-03 PROCEDURE — 99213 OFFICE O/P EST LOW 20 MIN: CPT

## 2023-04-03 PROCEDURE — 99213 OFFICE O/P EST LOW 20 MIN: CPT | Performed by: NURSE PRACTITIONER

## 2023-04-03 RX ORDER — LIDOCAINE HYDROCHLORIDE 20 MG/ML
JELLY TOPICAL ONCE
OUTPATIENT
Start: 2023-04-03 | End: 2023-04-03

## 2023-04-03 RX ORDER — CLOBETASOL PROPIONATE 0.5 MG/G
OINTMENT TOPICAL ONCE
OUTPATIENT
Start: 2023-04-03 | End: 2023-04-03

## 2023-04-03 RX ORDER — GENTAMICIN SULFATE 1 MG/G
OINTMENT TOPICAL ONCE
OUTPATIENT
Start: 2023-04-03 | End: 2023-04-03

## 2023-04-03 RX ORDER — GINSENG 100 MG
CAPSULE ORAL ONCE
OUTPATIENT
Start: 2023-04-03 | End: 2023-04-03

## 2023-04-03 RX ORDER — BACITRACIN ZINC AND POLYMYXIN B SULFATE 500; 1000 [USP'U]/G; [USP'U]/G
OINTMENT TOPICAL ONCE
OUTPATIENT
Start: 2023-04-03 | End: 2023-04-03

## 2023-04-03 RX ORDER — LIDOCAINE HYDROCHLORIDE 40 MG/ML
SOLUTION TOPICAL ONCE
OUTPATIENT
Start: 2023-04-03 | End: 2023-04-03

## 2023-04-03 RX ORDER — LIDOCAINE 50 MG/G
OINTMENT TOPICAL ONCE
OUTPATIENT
Start: 2023-04-03 | End: 2023-04-03

## 2023-04-03 RX ORDER — BACITRACIN, NEOMYCIN, POLYMYXIN B 400; 3.5; 5 [USP'U]/G; MG/G; [USP'U]/G
OINTMENT TOPICAL ONCE
OUTPATIENT
Start: 2023-04-03 | End: 2023-04-03

## 2023-04-03 RX ORDER — LIDOCAINE 40 MG/G
CREAM TOPICAL ONCE
OUTPATIENT
Start: 2023-04-03 | End: 2023-04-03

## 2023-04-03 ASSESSMENT — PAIN SCALES - GENERAL: PAINLEVEL_OUTOF10: 8

## 2023-04-03 ASSESSMENT — PAIN DESCRIPTION - ORIENTATION: ORIENTATION: LEFT

## 2023-04-03 ASSESSMENT — PAIN DESCRIPTION - LOCATION: LOCATION: GROIN

## 2023-04-03 ASSESSMENT — PAIN DESCRIPTION - DESCRIPTORS: DESCRIPTORS: BURNING

## 2023-04-03 NOTE — DISCHARGE INSTRUCTIONS
Visit Discharge/Physician Orders:  - If healed we can change appointment to virtual visit with home health      [facility]     Wound Location: Bilateral groin     Dressing orders:      1) Gather wound care supplies and arrange on clean table. 2) Wash your hands with soap and water or use alcohol based hand  for 20 seconds (sing \"Happy Birthday\" twice). 3) Cleanse wounds with normal saline or wound cleanser and gauze. Pat dry with clean gauze. 4) left groin - Apply triad to wound. Cover with silicone bordered foam. Change 3 times weekly. right groin- open to air     Keep all dressings clean & dry. Follow up visit:   3 Weeks on Monday April 24th at 2:30PM     Supplies: Home health to order supplies     Keep next scheduled appointment. Please give 24 hour notice if unable to keep appointment. [phone]     If you experience any of the following, please call the Wound Care Service during business hours: Monday through Friday 8:00 am - 4:30 pm  ([phone]). *Increase in pain              *Temperature over 101              *Increase in drainage from your wound or a foul odor              *Uncontrolled swelling              *Need for compression bandage changes due to slippage, breakthrough drainage     If you need medical attention outside of business hours, please contact your Primary Care Doctor or go to the nearest emergency room.

## 2023-04-03 NOTE — PROGRESS NOTES
and/or medications ordered, reviewing importance of compliance with outline treatment plan and any identifiable barriers to compliance and coordination of care based on plan and assessment as noted. Discharge Instructions       Discharge Instructions         Visit Discharge/Physician Orders:  - If healed we can change appointment to virtual visit with home health      [address] [facility]     Wound Location: Bilateral groin     Dressing orders:      1) Gather wound care supplies and arrange on clean table. 2) Wash your hands with soap and water or use alcohol based hand  for 20 seconds (sing \"Happy Birthday\" twice). 3) Cleanse wounds with normal saline or wound cleanser and gauze. Pat dry with clean gauze. 4) left groin - Apply triad to wound. Cover with silicone bordered foam. Change 3 times weekly. right groin- open to air     Keep all dressings clean & dry. Follow up visit:   3 Weeks on Monday April 24th at 2:30PM     Supplies: [facility] to order supplies     Keep next scheduled appointment. Please give 24 hour notice if unable to keep appointment. [phone]     If you experience any of the following, please call the Wound Care Service during business hours: Monday through Friday 8:00 am - 4:30 pm  ([phone]). *Increase in pain              *Temperature over 101              *Increase in drainage from your wound or a foul odor              *Uncontrolled swelling              *Need for compression bandage changes due to slippage, breakthrough drainage     If you need medical attention outside of business hours, please contact your Primary Care Doctor or go to the nearest emergency room.          Electronically signed by EDD Saini CNP on 4/3/2023 at 3:37 PM

## 2023-04-03 NOTE — PLAN OF CARE
Problem: Wound:  Goal: Will show signs of wound healing; wound closure and no evidence of infection  Description: Will show signs of wound healing; wound closure and no evidence of infection  Outcome: Progressing   Patient seen in clinic today for left groin wound. No s/s of infection. See AVS. Follow up in 3 week/s. Care plan reviewed with patient. Patient verbalize understanding of the plan of care and contribute to goal setting.

## 2023-04-06 ENCOUNTER — TELEPHONE (OUTPATIENT)
Dept: WOUND CARE | Age: 68
End: 2023-04-06

## 2023-04-06 NOTE — TELEPHONE ENCOUNTER
Spoke with Ez Barragan with [facility] regarding dressing orders for patient, patient was nearly healed at last visit so orders were changed to triad and a silicone bordered foam dressing. Ez Barragan stated they would do one more visit to discharge patient from home health.

## 2023-04-06 NOTE — TELEPHONE ENCOUNTER
----- Message from Kristy Champagne sent at 4/6/2023  8:02 AM EDT -----  [address] @ [address] [facility] ARE FOR TRIAD CREAM WITH FOAM DRESSING. THIS IS NOT SKILLED FOR THEM.  ALSO SAYS 3 X WK

## 2023-04-24 ENCOUNTER — TELEPHONE (OUTPATIENT)
Dept: WOUND CARE | Age: 68
End: 2023-04-24

## 2023-04-24 NOTE — TELEPHONE ENCOUNTER
Patient called and cancelled appointment for today stating that she is healed.  Informed patient that her referral is good from date of last appointment (04/03/2023) and can call if she has any concerns and be scheduled, if after the 90 days she will need a new referral. Patient voiced understanding

## 2023-06-05 ENCOUNTER — TELEPHONE (OUTPATIENT)
Dept: WOUND CARE | Age: 68
End: 2023-06-05

## 2023-06-05 NOTE — TELEPHONE ENCOUNTER
Spoke with patient who states she thinks her left groin in infected , her boyfriend states there is a big sore. Patient states she cannot tell if area is open or not. No openings until June 12th. When notified of this, patient states she does not think she can make it that long, told patient if she thinks area is infected or it gets works or it needs antibiotics, she may have to see her PCP, urgent care or ER. Patient voices understanding wants to keep appointment made and will update clinic.

## 2023-06-05 NOTE — TELEPHONE ENCOUNTER
----- Message from Mat Greer sent at 6/5/2023  9:17 AM EDT -----  [address] LEFT MESSAGE THAT SHE NEEDS TO SCHEDULE AN APPT

## 2023-06-20 ENCOUNTER — TELEPHONE (OUTPATIENT)
Dept: WOUND CARE | Age: 68
End: 2023-06-20

## 2025-07-17 ENCOUNTER — TRANSCRIBE ORDERS (OUTPATIENT)
Dept: ADMINISTRATIVE | Age: 70
End: 2025-07-17

## 2025-07-17 DIAGNOSIS — Z12.31 OTHER SCREENING MAMMOGRAM: Primary | ICD-10-CM

## 2025-07-17 DIAGNOSIS — E03.8 OTHER SPECIFIED HYPOTHYROIDISM: Primary | ICD-10-CM

## 2025-07-17 DIAGNOSIS — I70.213 ATHEROSCLEROSIS OF NATIVE ARTERY OF BOTH LOWER EXTREMITIES WITH INTERMITTENT CLAUDICATION: Primary | ICD-10-CM

## 2025-07-18 ENCOUNTER — TELEPHONE (OUTPATIENT)
Dept: CARDIOLOGY CLINIC | Age: 70
End: 2025-07-18

## 2025-07-18 NOTE — TELEPHONE ENCOUNTER
Received a NP referral that pt needs seen due to claudication in both legs.    Call to pt - call would not go thru - states pt has calling restrictions.    Number on referral [facility] [phone] - this number also has calling restrictions.